# Patient Record
Sex: MALE | Race: BLACK OR AFRICAN AMERICAN | Employment: OTHER | URBAN - METROPOLITAN AREA
[De-identification: names, ages, dates, MRNs, and addresses within clinical notes are randomized per-mention and may not be internally consistent; named-entity substitution may affect disease eponyms.]

---

## 2017-10-09 ENCOUNTER — HOME HEALTH ADMISSION (OUTPATIENT)
Dept: HOME HEALTH SERVICES | Facility: HOME HEALTH | Age: 82
End: 2017-10-09
Payer: MEDICARE

## 2017-10-12 ENCOUNTER — HOSPITAL ENCOUNTER (OUTPATIENT)
Dept: LAB | Age: 82
Discharge: HOME | End: 2017-10-12
Attending: FAMILY MEDICINE
Payer: MEDICARE

## 2017-10-12 ENCOUNTER — HOME CARE VISIT (OUTPATIENT)
Dept: SCHEDULING | Facility: HOME HEALTH | Age: 82
End: 2017-10-12
Payer: MEDICARE

## 2017-10-12 LAB
EST. AVERAGE GLUCOSE BLD GHB EST-MCNC: 134 MG/DL
HBA1C MFR BLD: 6.3 % (ref 4.8–6)

## 2017-10-12 PROCEDURE — 3331090002 HH PPS REVENUE DEBIT

## 2017-10-12 PROCEDURE — 3331090001 HH PPS REVENUE CREDIT

## 2017-10-12 PROCEDURE — 83036 HEMOGLOBIN GLYCOSYLATED A1C: CPT | Performed by: FAMILY MEDICINE

## 2017-10-12 PROCEDURE — G0299 HHS/HOSPICE OF RN EA 15 MIN: HCPCS

## 2017-10-12 PROCEDURE — 36415 COLL VENOUS BLD VENIPUNCTURE: CPT | Performed by: FAMILY MEDICINE

## 2017-10-12 PROCEDURE — 400013 HH SOC

## 2017-10-13 PROCEDURE — 3331090001 HH PPS REVENUE CREDIT

## 2017-10-13 PROCEDURE — 3331090002 HH PPS REVENUE DEBIT

## 2017-10-14 ENCOUNTER — HOME CARE VISIT (OUTPATIENT)
Dept: HOME HEALTH SERVICES | Facility: HOME HEALTH | Age: 82
End: 2017-10-14
Payer: MEDICARE

## 2017-10-14 PROCEDURE — 3331090002 HH PPS REVENUE DEBIT

## 2017-10-14 PROCEDURE — 3331090001 HH PPS REVENUE CREDIT

## 2017-10-15 VITALS
SYSTOLIC BLOOD PRESSURE: 110 MMHG | TEMPERATURE: 98.4 F | OXYGEN SATURATION: 97 % | HEART RATE: 83 BPM | DIASTOLIC BLOOD PRESSURE: 74 MMHG | RESPIRATION RATE: 16 BRPM

## 2017-10-15 PROCEDURE — 3331090002 HH PPS REVENUE DEBIT

## 2017-10-15 PROCEDURE — 3331090001 HH PPS REVENUE CREDIT

## 2017-10-16 PROCEDURE — 3331090001 HH PPS REVENUE CREDIT

## 2017-10-16 PROCEDURE — 3331090002 HH PPS REVENUE DEBIT

## 2017-10-17 ENCOUNTER — HOME CARE VISIT (OUTPATIENT)
Dept: SCHEDULING | Facility: HOME HEALTH | Age: 82
End: 2017-10-17
Payer: MEDICARE

## 2017-10-17 ENCOUNTER — HOSPITAL ENCOUNTER (EMERGENCY)
Age: 82
Discharge: HOME | End: 2017-10-17
Attending: EMERGENCY MEDICINE
Payer: MEDICARE

## 2017-10-17 ENCOUNTER — HOME CARE VISIT (OUTPATIENT)
Dept: HOME HEALTH SERVICES | Facility: HOME HEALTH | Age: 82
End: 2017-10-17
Payer: MEDICARE

## 2017-10-17 VITALS
HEART RATE: 89 BPM | SYSTOLIC BLOOD PRESSURE: 130 MMHG | DIASTOLIC BLOOD PRESSURE: 80 MMHG | OXYGEN SATURATION: 96 % | RESPIRATION RATE: 16 BRPM | TEMPERATURE: 98.5 F

## 2017-10-17 VITALS
SYSTOLIC BLOOD PRESSURE: 137 MMHG | BODY MASS INDEX: 27.7 KG/M2 | WEIGHT: 187 LBS | TEMPERATURE: 97.5 F | HEIGHT: 69 IN | DIASTOLIC BLOOD PRESSURE: 77 MMHG | HEART RATE: 82 BPM | RESPIRATION RATE: 18 BRPM

## 2017-10-17 DIAGNOSIS — R53.82 CHRONIC FATIGUE: ICD-10-CM

## 2017-10-17 DIAGNOSIS — R53.1 WEAKNESS: Primary | ICD-10-CM

## 2017-10-17 LAB
ALBUMIN SERPL-MCNC: 3.2 G/DL (ref 3.2–4.6)
ALBUMIN/GLOB SERPL: 0.7 {RATIO} (ref 1.2–3.5)
ALP SERPL-CCNC: 76 U/L (ref 50–136)
ALT SERPL-CCNC: 16 U/L (ref 12–65)
ANION GAP SERPL CALC-SCNC: 6 MMOL/L (ref 7–16)
AST SERPL-CCNC: 25 U/L (ref 15–37)
ATRIAL RATE: 105 BPM
BASOPHILS # BLD: 0 K/UL (ref 0–0.2)
BASOPHILS NFR BLD: 0 % (ref 0–2)
BILIRUB SERPL-MCNC: 0.4 MG/DL (ref 0.2–1.1)
BUN SERPL-MCNC: 16 MG/DL (ref 8–23)
CALCIUM SERPL-MCNC: 9.1 MG/DL (ref 8.3–10.4)
CALCULATED R AXIS, ECG10: 78 DEGREES
CALCULATED T AXIS, ECG11: -16 DEGREES
CHLORIDE SERPL-SCNC: 108 MMOL/L (ref 98–107)
CO2 SERPL-SCNC: 26 MMOL/L (ref 21–32)
CREAT SERPL-MCNC: 1.08 MG/DL (ref 0.8–1.5)
DIAGNOSIS, 93000: NORMAL
DIFFERENTIAL METHOD BLD: ABNORMAL
EOSINOPHIL # BLD: 0.1 K/UL (ref 0–0.8)
EOSINOPHIL NFR BLD: 1 % (ref 0.5–7.8)
ERYTHROCYTE [DISTWIDTH] IN BLOOD BY AUTOMATED COUNT: 14.4 % (ref 11.9–14.6)
GLOBULIN SER CALC-MCNC: 4.9 G/DL (ref 2.3–3.5)
GLUCOSE SERPL-MCNC: 99 MG/DL (ref 65–100)
HCT VFR BLD AUTO: 32.2 % (ref 41.1–50.3)
HGB BLD-MCNC: 11.3 G/DL (ref 13.6–17.2)
IMM GRANULOCYTES # BLD: 0 K/UL (ref 0–0.5)
IMM GRANULOCYTES NFR BLD: 0.3 % (ref 0–5)
LYMPHOCYTES # BLD: 1.8 K/UL (ref 0.5–4.6)
LYMPHOCYTES NFR BLD: 27 % (ref 13–44)
MCH RBC QN AUTO: 32.2 PG (ref 26.1–32.9)
MCHC RBC AUTO-ENTMCNC: 35.1 G/DL (ref 31.4–35)
MCV RBC AUTO: 91.7 FL (ref 79.6–97.8)
MONOCYTES # BLD: 0.7 K/UL (ref 0.1–1.3)
MONOCYTES NFR BLD: 11 % (ref 4–12)
NEUTS SEG # BLD: 4 K/UL (ref 1.7–8.2)
NEUTS SEG NFR BLD: 61 % (ref 43–78)
PLATELET # BLD AUTO: 191 K/UL (ref 150–450)
PMV BLD AUTO: 11.6 FL (ref 10.8–14.1)
POTASSIUM SERPL-SCNC: 4 MMOL/L (ref 3.5–5.1)
PROT SERPL-MCNC: 8.1 G/DL (ref 6.3–8.2)
Q-T INTERVAL, ECG07: 360 MS
QRS DURATION, ECG06: 100 MS
QTC CALCULATION (BEZET), ECG08: 415 MS
RBC # BLD AUTO: 3.51 M/UL (ref 4.23–5.67)
SODIUM SERPL-SCNC: 140 MMOL/L (ref 136–145)
VENTRICULAR RATE, ECG03: 80 BPM
WBC # BLD AUTO: 6.6 K/UL (ref 4.3–11.1)

## 2017-10-17 PROCEDURE — 80053 COMPREHEN METABOLIC PANEL: CPT | Performed by: EMERGENCY MEDICINE

## 2017-10-17 PROCEDURE — 3331090001 HH PPS REVENUE CREDIT

## 2017-10-17 PROCEDURE — 3331090002 HH PPS REVENUE DEBIT

## 2017-10-17 PROCEDURE — 85025 COMPLETE CBC W/AUTO DIFF WBC: CPT | Performed by: EMERGENCY MEDICINE

## 2017-10-17 PROCEDURE — 93005 ELECTROCARDIOGRAM TRACING: CPT | Performed by: EMERGENCY MEDICINE

## 2017-10-17 PROCEDURE — 99284 EMERGENCY DEPT VISIT MOD MDM: CPT | Performed by: EMERGENCY MEDICINE

## 2017-10-17 PROCEDURE — G0299 HHS/HOSPICE OF RN EA 15 MIN: HCPCS

## 2017-10-17 NOTE — ED TRIAGE NOTES
PT arrives via EMS after a fall last night. Pt was discharged was [facility] this AM, family states no d/c paperwork or diagnosis was given. Pt has been weak for the past year and is being seen by home health twice for generalized weakness diagnosis. Pt has no new complaints today, is not in distress, all VSS stable.    VS: 126/85, hr 85

## 2017-10-17 NOTE — DISCHARGE INSTRUCTIONS
Fatigue: Care Instructions  Your Care Instructions  Fatigue is a feeling of tiredness, exhaustion, or lack of energy. You may feel fatigue because of too much or not enough activity. It can also come from stress, lack of sleep, boredom, and poor diet. Many medical problems, such as viral infections, can cause fatigue. Emotional problems, especially depression, are often the cause of fatigue. Fatigue is most often a symptom of another problem. Treatment for fatigue depends on the cause. For example, if you have fatigue because you have a certain health problem, treating this problem also treats your fatigue. If depression or anxiety is the cause, treatment may help. Follow-up care is a key part of your treatment and safety. Be sure to make and go to all appointments, and call your doctor if you are having problems. It's also a good idea to know your test results and keep a list of the medicines you take. How can you care for yourself at home? · Get regular exercise. But don't overdo it. Go back and forth between rest and exercise. · Get plenty of rest.  · Eat a healthy diet. Do not skip meals, especially breakfast.  · Reduce your use of caffeine, tobacco, and alcohol. Caffeine is most often found in coffee, tea, cola drinks, and chocolate. · Limit medicines that can cause fatigue. This includes tranquilizers and cold and allergy medicines. When should you call for help? Watch closely for changes in your health, and be sure to contact your doctor if:  · You have new symptoms such as fever or a rash. · Your fatigue gets worse. · You have been feeling down, depressed, or hopeless. Or you may have lost interest in things that you usually enjoy. · You are not getting better as expected. Where can you learn more? Go to http://katie-natalya.info/. Enter Z530 in the search box to learn more about \"Fatigue: Care Instructions. \"  Current as of: March 20, 2017  Content Version: 11.3  © 2130-8542 [employer], [employer]. Care instructions adapted under license by [employer] (which disclaims liability or warranty for this information). If you have questions about a medical condition or this instruction, always ask your healthcare professional. [address] any warranty or liability for your use of this information. Weakness: Care Instructions  Your Care Instructions  Weakness is a lack of physical or muscle strength. You may feel that you need to make extra effort to move your arms, legs, or other muscles. Generalized weakness means that you feel weak in most areas of your body. Another type of weakness may affect just one muscle or group of muscles. You may feel weak and tired after you have done too much activity, such as taking an extra-long hike. This is not a serious problem. It often goes away on its own. Feeling weak can also be caused by medical conditions like thyroid problems, depression, or a virus. Sometimes the cause can be serious. Your doctor may want to do more tests to try to find the cause of the weakness. The doctor has checked you carefully, but problems can develop later. If you notice any problems or new symptoms, get medical treatment right away. Follow-up care is a key part of your treatment and safety. Be sure to make and go to all appointments, and call your doctor if you are having problems. It's also a good idea to know your test results and keep a list of the medicines you take. How can you care for yourself at home? · Rest when you feel tired. · Be safe with medicines. If your doctor prescribed medicine, take it exactly as prescribed. Call your doctor if you think you are having a problem with your medicine. You will get more details on the specific medicines your doctor prescribes. · Do not skip meals. Eating a balanced diet may increase your energy level.   · Get some physical activity every day, but do not get too tired.  When should you call for help? Call your doctor now or seek immediate medical care if:  · You have new or worse weakness. · You are dizzy or lightheaded, or you feel like you may faint. Watch closely for changes in your health, and be sure to contact your doctor if:  · You do not get better as expected. Where can you learn more? Go to http://katie-natalya.info/. Enter 574 [id number] 3591 in the search box to learn more about \"Weakness: Care Instructions. \"  Current as of: March 20, 2017  Content Version: 11.3  © 3932-3228 [facility]. Care instructions adapted under license by [employer] (which disclaims liability or warranty for this information). If you have questions about a medical condition or this instruction, always ask your healthcare professional. [address] any warranty or liability for your use of this information.

## 2017-10-17 NOTE — PROGRESS NOTES
Patient current with [facility] for Nursing only. Per MD , will have [facility] evaluate for PT / OT.

## 2017-10-17 NOTE — ED PROVIDER NOTES
HPI Comments: 49-year-old male presents with weakness ×1 year. He was seen at [facility] last night and had normal labs, head CT and chest x-ray. His wife who is bedside states there was not given any discharge paperwork or no diagnosis. He has no significant complaints such as chest pain, shortness of breath, vomiting, diarrhea or leg swelling. He states that his primary doctor \"can't figure it out\", in regards to patient's weakness. No significant headaches. Moving all extremities good strength. History of recurrent falls but no recent falls. Patient is a 80 y.o. male presenting with other event. The history is provided by the patient. No  was used. Other   Pertinent negatives include no abdominal pain, no headaches and no shortness of breath. Past Medical History:   Diagnosis Date    Anxiety 12/8/2015    BPH (benign prostatic hypertrophy) 1/30/2011    CKD (chronic kidney disease) stage 3, GFR 30-59 ml/min 1/30/2011    Diabetes mellitus type 2, controlled ([address] 12/8/2015    HTN (hypertension) 10/15/2009    Hyperlipemia, mixed 12/8/2015    Pacemaker ([employer] PM placed 1/31/2011) 2/1/2011    SSS (sick sinus syndrome) ([address]        Past Surgical History:   Procedure Laterality Date    HX APPENDECTOMY      HX OTHER SURGICAL  2008    transrectal resection of carcinoid tumor    HX PACEMAKER      Pacemaker 2/2011    HX TURP  2011         History reviewed. No pertinent family history. Social History     Social History    Marital status:      Spouse name: N/A    Number of children: N/A    Years of education: N/A     Occupational History    Not on file. Social History Main Topics    Smoking status: Never Smoker    Smokeless tobacco: Never Used    Alcohol use No    Drug use: No    Sexual activity: No     Other Topics Concern    Not on file     Social History Narrative         ALLERGIES: Doxazosin    Review of Systems   Constitutional: Positive for fatigue. Negative for fever. Respiratory: Negative for cough and shortness of breath. Gastrointestinal: Negative for abdominal pain, nausea and vomiting. Genitourinary: Negative for dysuria. Skin: Negative for rash. Neurological: Negative for weakness and headaches. Vitals:    10/17/17 1312   BP: 137/77   Pulse: 82   Resp: 18   Temp: 97.5 °F (36.4 °C)   Weight: 84.8 kg (187 lb)   Height: 5' 9\" (1.753 m)            Physical Exam   Constitutional: He is oriented to person, place, and time. He appears well-developed and well-nourished. No distress. HENT:   Head: Normocephalic and atraumatic. Eyes: Conjunctivae and EOM are normal. Pupils are equal, round, and reactive to light. Neck: Normal range of motion. Neck supple. Cardiovascular: Normal rate, regular rhythm and normal heart sounds. Pulmonary/Chest: Effort normal and breath sounds normal. No respiratory distress. He has no wheezes. He has no rales. Abdominal: Soft. He exhibits no distension. There is no tenderness. There is no rebound. Musculoskeletal: Normal range of motion. He exhibits no edema or tenderness. Neurological: He is alert and oriented to person, place, and time. No cranial nerve deficit. Patient has a symmetric smile. Good strength in bilateral upper and lower extremities. Skin: Skin is warm and dry. No rash noted. He is not diaphoretic. Psychiatric: He has a normal mood and affect. His behavior is normal.   Vitals reviewed. MDM  Number of Diagnoses or Management Options  Chronic fatigue: new and does not require workup  Weakness: new and does not require workup  Diagnosis management comments: Patient has had long-standing weakness for about one year. He was evaluated at [facility] last night  And ound to have no significant findings or admission criteria. I do believe the patient will benefit from therapy/occupational therapy and have had the  evaluate him for this.   Patient feels comfortable with discharge to home. Return precautions discussed. I discussed the results of all labs, procedures, radiographs, and treatments with the patient and available family. Treatment plan is agreed upon and the patient is ready for discharge. Questions about treatment in the ED and differential diagnosis of presenting condition were answered. Patient was given verbal discharge instructions including, but not limited to, importance of returning to the emergency department for any concern of worsening or continued symptoms. Instructions were given to follow up with a primary care provider or specialist within 1-2 days. Adverse effects of medications, if prescribed, were discussed and patient was advised to refrain from significant physical activity until followed up by primary care physician and to not drive or operate heavy machinery after taking any sedating substances.           Amount and/or Complexity of Data Reviewed  Clinical lab tests: ordered and reviewed (Results for orders placed or performed during the hospital encounter of 10/17/17  -CBC WITH AUTOMATED DIFF       Result                                            Value                         Ref Range                       WBC                                               6.6                           4.3 - 11.1 K/uL                 RBC                                               3.51 (L)                      4.23 - 5.67 M/uL                HGB                                               11.3 (L)                      13.6 - 17.2 g/dL                HCT                                               32.2 (L)                      41.1 - 50.3 %                   MCV                                               91.7                          79.6 - 97.8 FL                  MCH                                               32.2                          26.1 - 32.9 PG                  MCHC                                              35.1 (H) 31.4 - 35.0 g/dL                RDW                                               14.4                          11.9 - 14.6 %                   PLATELET                                          191                           150 - 450 K/uL                  MPV                                               11.6                          10.8 - 14.1 FL                  DF                                                AUTOMATED                                                     NEUTROPHILS                                       61                            43 - 78 %                       LYMPHOCYTES                                       27                            13 - 44 %                       MONOCYTES                                         11                            4.0 - 12.0 %                    EOSINOPHILS                                       1                             0.5 - 7.8 %                     BASOPHILS                                         0                             0.0 - 2.0 %                     IMMATURE GRANULOCYTES                             0.3                           0.0 - 5.0 %                     ABS. NEUTROPHILS                                  4.0                           1.7 - 8.2 K/UL                  ABS. LYMPHOCYTES                                  1.8                           0.5 - 4.6 K/UL                  ABS. MONOCYTES                                    0.7                           0.1 - 1.3 K/UL                  ABS. EOSINOPHILS                                  0.1                           0.0 - 0.8 K/UL                  ABS. BASOPHILS                                    0.0                           0.0 - 0.2 K/UL                  ABS. IMM.  GRANS.                                  0.0                           0.0 - 0.5 K/UL             -METABOLIC PANEL, COMPREHENSIVE       Result                                            Value                         Ref Range                       Sodium                                            140                           136 - 145 mmol/L                Potassium                                         4.0                           3.5 - 5.1 mmol/L                Chloride                                          108 (H)                       98 - 107 mmol/L                 CO2                                               26                            21 - 32 mmol/L                  Anion gap                                         6 (L)                         7 - 16 mmol/L                   Glucose                                           99                            65 - 100 mg/dL                  BUN                                               16                            8 - 23 MG/DL                    Creatinine                                        1.08                          0.8 - 1.5 MG/DL                 GFR est AA                                        >60                           >60 ml/min/1.73m2               GFR est non-AA                                    >60                           >60 ml/min/1.73m2               Calcium                                           9.1                           8.3 - 10.4 MG/DL                Bilirubin, total                                  0.4                           0.2 - 1.1 MG/DL                 ALT (SGPT)                                        16                            12 - 65 U/L                     AST (SGOT)                                        25                            15 - 37 U/L                     Alk. phosphatase                                  76                            50 - 136 U/L                    Protein, total                                    8.1                           6.3 - 8.2 g/dL                  Albumin                                           3.2                           3.2 - 4.6 g/dL                  Globulin 4.9 (H)                       2.3 - 3.5 g/dL                  A-G Ratio                                         0.7 (L)                       1.2 - 3.5                  -EKG, 12 LEAD, INITIAL       Result                                            Value                         Ref Range                       Ventricular Rate                                  80                            BPM                             Atrial Rate                                       105                           BPM                             QRS Duration                                      100                           ms                              Q-T Interval                                      360                           ms                              QTC Calculation (Bezet)                           415                           ms                              Calculated R Axis                                 78                            degrees                         Calculated T Axis                                 -16                           degrees                         Diagnosis                                                                                                   !! AGE AND GENDER SPECIFIC ECG ANALYSIS !!   NSR long first degree AV block    Low voltage QRS   Cannot rule out Anterior infarct (cited on or before 14-JUL-2016)   Abnormal ECG   When compared with ECG of 19-NOV-2016 15:09,   Previous ECG has undetermined rhythm, needs review   Questionable change in QRS axis   T wave inversion now evident in Inferior leads   Confirmed by NIC CANO (), Ricarda Quezada ([id number]) on 10/17/2017 2:29:19 PM     )  Review and summarize past medical records: yes  Independent visualization of images, tracings, or specimens: yes    Risk of Complications, Morbidity, and/or Mortality  Presenting problems: moderate  Diagnostic procedures: moderate  Management options: moderate    Patient Progress  Patient progress: stable    ED Course       Procedures

## 2017-10-17 NOTE — ED NOTES
I have reviewed discharge instructions with the patient and family. The patient and family verbalized understanding. Patient left ED via Discharge Method: wheelchair to Home with son and wife). Opportunity for questions and clarification provided. Patient given 0 scripts. Family aware the home health nursing,OT, and PT are to contact them about starting routine visits.

## 2017-10-17 NOTE — ED NOTES
Pt is orientated to person and place, but not year.  Pt also states he weights 275, which is not accurate

## 2017-10-18 ENCOUNTER — HOME CARE VISIT (OUTPATIENT)
Dept: SCHEDULING | Facility: HOME HEALTH | Age: 82
End: 2017-10-18
Payer: MEDICARE

## 2017-10-18 ENCOUNTER — PATIENT OUTREACH (OUTPATIENT)
Dept: CASE MANAGEMENT | Age: 82
End: 2017-10-18

## 2017-10-18 VITALS
DIASTOLIC BLOOD PRESSURE: 62 MMHG | RESPIRATION RATE: 16 BRPM | OXYGEN SATURATION: 95 % | SYSTOLIC BLOOD PRESSURE: 118 MMHG | HEART RATE: 80 BPM | TEMPERATURE: 98.5 F

## 2017-10-18 PROCEDURE — 3331090001 HH PPS REVENUE CREDIT

## 2017-10-18 PROCEDURE — G0299 HHS/HOSPICE OF RN EA 15 MIN: HCPCS

## 2017-10-18 PROCEDURE — 3331090002 HH PPS REVENUE DEBIT

## 2017-10-18 NOTE — PROGRESS NOTES
This note will not be viewable in [address] Transition of Care Discharge Follow-up Questionnaire   Date/Time of Call:   October 18, 2017 3:15PM   What was the patient hospitalized for? Patient seen in ED on 10/17/2017 for   Weakness    Chronic fatigue                   Does the patient understand his/her diagnosis and/or treatment and what happened during the hospitalization? Patient states understanding of diagnosis and treatment during hospitalization. Did the patient receive discharge instructions? Yes     Review any discharge instructions (see notes in [facility]). Ask patient if they understand these. Do they have any questions? Patient states understanding of discharge instructions, patient states no questions. Were home services ordered (nursing, PT, OT, ST, etc.)? Patient is currently receiving home health services. If so, has the first visit occurred? If not, why? (Assist with coordination of services if necessary.) Yes         Was any DME ordered? No durable medical equipment ordered. If so, has it been received? If not, why?  (Assist with coordination of arranging DME orders if necessary. ) NA         Complete a review of all medications (new, continued and discontinued meds per the D/C instructions and medication tab in [facility]). Care Coordinator reviewed all medications with patient per [facility], no new medication/s prescribed. Were all new prescriptions filled? If not, why?  (Assist with obtainment of medications if necessary. ) NA         Does the patient understand the purpose and dosing instructions for all medications? (If patient has questions, provide explanation and education.) Patient states understanding of current medications and dosing instructions. Does the patient have any problems in performing ADLs? (If patient is unable to perform ADLs  what is the limiting factor(s)?   Do they have a support system that can assist? If no support system is present, discuss possible assistance that they may be able to obtain.) Patient states he is independent with ADL's and ambulation. Patient states he is doing okay, states he still feels somewhat weak. Patient states spouse is assisting him as needed. Does the patient have all follow-up appointments scheduled? 7 day f/up with PCP?    7-14 day f/up with specialist?    If f/up has not been made  what actions has the care coordinator made to accomplish this? Has transportation been arranged? [facility] follow-up should be within 7 days of discharge; all others should have PCP follow-up within 7 days of discharge; follow-ups with other specialists should be within 7-14 days of discharge.) No, Care Coordinator educated patient on the importance of scheduling follow up with PCP within 7 days. Patient states he will call and schedule follow up with PCP today. Patient declined assistance from Care Coordinator to obtain follow up appointment. Yes        NA           Any other questions or concerns expressed by the patient? No further needs identified, patient instructed to call Care Coordinator if further questions or concerns arise. Schedule next appointment with MINOO Cooney or refer to RN Case Manager/  per the workflow guidelines. When is care coordinators next follow-up call scheduled? If referred for CCM  what RN care manager was the referral assigned?  NA          NA      NA         DAIJA Call Completed By: LINDY Willett ACO  Care Coordinator

## 2017-10-19 ENCOUNTER — APPOINTMENT (OUTPATIENT)
Dept: CT IMAGING | Age: 82
End: 2017-10-19
Attending: EMERGENCY MEDICINE
Payer: MEDICARE

## 2017-10-19 ENCOUNTER — APPOINTMENT (OUTPATIENT)
Dept: GENERAL RADIOLOGY | Age: 82
End: 2017-10-19
Attending: EMERGENCY MEDICINE
Payer: MEDICARE

## 2017-10-19 ENCOUNTER — HOSPITAL ENCOUNTER (EMERGENCY)
Age: 82
Discharge: HOME | End: 2017-10-19
Attending: EMERGENCY MEDICINE
Payer: MEDICARE

## 2017-10-19 VITALS
SYSTOLIC BLOOD PRESSURE: 164 MMHG | BODY MASS INDEX: 29.62 KG/M2 | TEMPERATURE: 98.2 F | HEIGHT: 69 IN | WEIGHT: 200 LBS | DIASTOLIC BLOOD PRESSURE: 88 MMHG | HEART RATE: 82 BPM | RESPIRATION RATE: 18 BRPM | OXYGEN SATURATION: 93 %

## 2017-10-19 DIAGNOSIS — R53.1 WEAKNESS: Primary | ICD-10-CM

## 2017-10-19 LAB
ALBUMIN SERPL-MCNC: 2.9 G/DL (ref 3.2–4.6)
ALBUMIN/GLOB SERPL: 0.6 {RATIO} (ref 1.2–3.5)
ALP SERPL-CCNC: 72 U/L (ref 50–136)
ALT SERPL-CCNC: 13 U/L (ref 12–65)
ANION GAP SERPL CALC-SCNC: 8 MMOL/L (ref 7–16)
AST SERPL-CCNC: 18 U/L (ref 15–37)
ATRIAL RATE: 83 BPM
BILIRUB SERPL-MCNC: 0.4 MG/DL (ref 0.2–1.1)
BUN SERPL-MCNC: 13 MG/DL (ref 8–23)
CALCIUM SERPL-MCNC: 8.2 MG/DL (ref 8.3–10.4)
CALCULATED P AXIS, ECG09: 72 DEGREES
CALCULATED R AXIS, ECG10: 59 DEGREES
CALCULATED T AXIS, ECG11: -45 DEGREES
CHLORIDE SERPL-SCNC: 108 MMOL/L (ref 98–107)
CO2 SERPL-SCNC: 25 MMOL/L (ref 21–32)
CREAT SERPL-MCNC: 1.07 MG/DL (ref 0.8–1.5)
DIAGNOSIS, 93000: NORMAL
GLOBULIN SER CALC-MCNC: 4.7 G/DL (ref 2.3–3.5)
GLUCOSE SERPL-MCNC: 105 MG/DL (ref 65–100)
P-R INTERVAL, ECG05: 360 MS
POTASSIUM SERPL-SCNC: 3.2 MMOL/L (ref 3.5–5.1)
PROT SERPL-MCNC: 7.6 G/DL (ref 6.3–8.2)
Q-T INTERVAL, ECG07: 364 MS
QRS DURATION, ECG06: 104 MS
QTC CALCULATION (BEZET), ECG08: 427 MS
SODIUM SERPL-SCNC: 141 MMOL/L (ref 136–145)
TROPONIN I SERPL-MCNC: <0.02 NG/ML (ref 0.02–0.05)
VENTRICULAR RATE, ECG03: 83 BPM

## 2017-10-19 PROCEDURE — 71010 XR CHEST PORT: CPT

## 2017-10-19 PROCEDURE — 3331090001 HH PPS REVENUE CREDIT

## 2017-10-19 PROCEDURE — 3331090002 HH PPS REVENUE DEBIT

## 2017-10-19 PROCEDURE — 84484 ASSAY OF TROPONIN QUANT: CPT | Performed by: EMERGENCY MEDICINE

## 2017-10-19 PROCEDURE — 85025 COMPLETE CBC W/AUTO DIFF WBC: CPT | Performed by: EMERGENCY MEDICINE

## 2017-10-19 PROCEDURE — 93005 ELECTROCARDIOGRAM TRACING: CPT | Performed by: EMERGENCY MEDICINE

## 2017-10-19 PROCEDURE — 70450 CT HEAD/BRAIN W/O DYE: CPT

## 2017-10-19 PROCEDURE — 74011250637 HC RX REV CODE- 250/637: Performed by: EMERGENCY MEDICINE

## 2017-10-19 PROCEDURE — 99285 EMERGENCY DEPT VISIT HI MDM: CPT | Performed by: EMERGENCY MEDICINE

## 2017-10-19 PROCEDURE — 80053 COMPREHEN METABOLIC PANEL: CPT | Performed by: EMERGENCY MEDICINE

## 2017-10-19 PROCEDURE — 81003 URINALYSIS AUTO W/O SCOPE: CPT | Performed by: EMERGENCY MEDICINE

## 2017-10-19 RX ORDER — POTASSIUM CHLORIDE 20 MEQ/1
40 TABLET, EXTENDED RELEASE ORAL
Status: COMPLETED | OUTPATIENT
Start: 2017-10-19 | End: 2017-10-19

## 2017-10-19 RX ADMIN — POTASSIUM CHLORIDE 40 MEQ: 20 TABLET, EXTENDED RELEASE ORAL at 15:19

## 2017-10-19 NOTE — PROGRESS NOTES
Spoke with patient's wife who states that they do have home health with nursing. SW asked to add a  with home health but she declined. He was also recently referred with PT and OT with his home health but it has not started as of yet per his wife. She states that she may look into hiring a caretaker but not sure if they can afford. She has two sons but one also has health issues and one lives out of town.

## 2017-10-19 NOTE — ED PROVIDER NOTES
HPI Comments: Patient is an 70-year-old malewith a history of hypertension, diabetes, and pacemaker placement for sick sinus syndrome. He arrives in the emergency department via EMS from home complaining of generalized weakness and fatigue. It appears that this is a chronic complaint for months to possibly a year. He has had several evaluations at the [facility] emergency Department. He and family were here in our ER 2 days ago for the same complaints with a negative workup. He denies any chest pain or dyspnea. He denies any syncope. He states he is just too weak to get up and get around the house. Family states they are having a hard time caring for him. EMS reported that he did have some brief arrhythmia    Patient is a 80 y.o. male presenting with lethargy. The history is provided by the patient and the spouse. Lethargy   This is a chronic problem. The current episode started more than 1 week ago. The problem occurs constantly. The problem has been gradually worsening. Pertinent negatives include no chest pain, no abdominal pain, no headaches and no shortness of breath. Nothing relieves the symptoms. Past Medical History:   Diagnosis Date    Anxiety 12/8/2015    BPH (benign prostatic hypertrophy) 1/30/2011    CKD (chronic kidney disease) stage 3, GFR 30-59 ml/min 1/30/2011    Diabetes mellitus type 2, controlled ([address] 12/8/2015    HTN (hypertension) 10/15/2009    Hyperlipemia, mixed 12/8/2015    Pacemaker ([employer] [facility] placed 1/31/2011) 2/1/2011    SSS (sick sinus syndrome) ([address]        Past Surgical History:   Procedure Laterality Date    HX APPENDECTOMY      HX OTHER SURGICAL  2008    transrectal resection of carcinoid tumor    HX PACEMAKER      Pacemaker 2/2011    HX TURP  2011         History reviewed. No pertinent family history.     Social History     Social History    Marital status:      Spouse name: N/A    Number of children: N/A    Years of education: N/A Occupational History    Not on file. Social History Main Topics    Smoking status: Never Smoker    Smokeless tobacco: Never Used    Alcohol use No    Drug use: No    Sexual activity: No     Other Topics Concern    Not on file     Social History Narrative         ALLERGIES: Doxazosin    Review of Systems   Constitutional: Positive for fatigue. Negative for chills and fever. HENT: Negative. Eyes: Negative. Respiratory: Negative for shortness of breath. Cardiovascular: Negative for chest pain. Gastrointestinal: Negative for abdominal pain. Endocrine: Negative. Genitourinary: Negative. Musculoskeletal: Negative. Skin: Negative. Neurological: Positive for weakness. Negative for seizures, syncope, numbness and headaches. Vitals:    10/19/17 1409 10/19/17 1429 10/19/17 1449 10/19/17 1509   BP: 126/76 125/75 129/77 133/78   Pulse: 81 81 82 77   Resp:       Temp:       SpO2: 98% 99% 96% 91%   Weight:       Height:                Physical Exam   Constitutional: He is oriented to person, place, and time. He appears well-developed and well-nourished. HENT:   Head: Normocephalic and atraumatic. Eyes: EOM are normal. Pupils are equal, round, and reactive to light. Cardiovascular: Normal rate and regular rhythm. Pulmonary/Chest: Effort normal and breath sounds normal. No respiratory distress. Pacemaker on the left upper chest   Abdominal: Soft. There is no tenderness. Neurological: He is alert and oriented to person, place, and time. No cranial nerve deficit or sensory deficit. Diffuse generalized weakness without focal deficits   Skin: Skin is warm and dry. No rash noted. Nursing note and vitals reviewed.        MDM  Number of Diagnoses or Management Options  Diagnosis management comments: Differential diagnosis includes weakness, fatigue, anemia, infection, stroke, dehydration, electrolyte imbalance    Prior records were reviewed and he has had 2 recent visits to the [facility] emergency Department with negative workups. He was last here in our ER 2 days ago for the same thing. EKG now shows a normal sinus rhythm with a rate of 83 and no acute ischemic changes. Labs are unremarkable. I have ordered a repeat CAT scan of the head and social work will also look into the case and speak with the family. Amount and/or Complexity of Data Reviewed  Clinical lab tests: ordered and reviewed  Tests in the radiology section of CPT®: ordered and reviewed  Review and summarize past medical records: yes  Independent visualization of images, tracings, or specimens: yes    Risk of Complications, Morbidity, and/or Mortality  Presenting problems: moderate  Diagnostic procedures: moderate  Management options: moderate    Patient Progress  Patient progress: stable    ED Course   4:01 PM  vital signs are normal and labs are unremarkable. We also interrogated his pacemaker in the [facility] representative called and reported the patient has had no arrhythmias. He is in a normal sinus rhythm now he does intermittently have ventricular pacing and that is possibly what the paramedics noted while en route in the ambulance. CT head is negative for any acute process. 5:19 PM  Urinalysis is clean, labs are unremarkable. Social work also met with the patient and will arrange for some additional resources. At the current time there is no reason for 4 medical admission tonight. Voice dictation software was used during the making of this note. This software is not perfect and grammatical and other typographical errors may be present. This note has been proofread, but may still contain errors.   Dev Ruth MD; 10/19/2017 @5:19 PM   ===================================================================        Procedures

## 2017-10-19 NOTE — ED TRIAGE NOTES
Pt arrives via [facility] complaining of weakness and fatigue. Pt had a 6 beat run of vtach, has also gone into afib rvr and other rythms according to EMS. Pt was in the low 80s o2 when ems arrived. Complaining of back pain as well. Pt alert and oriented. Has a pace maker and spinal cord stimulator.

## 2017-10-19 NOTE — DISCHARGE INSTRUCTIONS
Weakness: Care Instructions  Your Care Instructions  Weakness is a lack of physical or muscle strength. You may feel that you need to make extra effort to move your arms, legs, or other muscles. Generalized weakness means that you feel weak in most areas of your body. Another type of weakness may affect just one muscle or group of muscles. You may feel weak and tired after you have done too much activity, such as taking an extra-long hike. This is not a serious problem. It often goes away on its own. Feeling weak can also be caused by medical conditions like thyroid problems, depression, or a virus. Sometimes the cause can be serious. Your doctor may want to do more tests to try to find the cause of the weakness. The doctor has checked you carefully, but problems can develop later. If you notice any problems or new symptoms, get medical treatment right away. Follow-up care is a key part of your treatment and safety. Be sure to make and go to all appointments, and call your doctor if you are having problems. It's also a good idea to know your test results and keep a list of the medicines you take. How can you care for yourself at home? · Rest when you feel tired. · Be safe with medicines. If your doctor prescribed medicine, take it exactly as prescribed. Call your doctor if you think you are having a problem with your medicine. You will get more details on the specific medicines your doctor prescribes. · Do not skip meals. Eating a balanced diet may increase your energy level. · Get some physical activity every day, but do not get too tired. When should you call for help? Call your doctor now or seek immediate medical care if:  · You have new or worse weakness. · You are dizzy or lightheaded, or you feel like you may faint. Watch closely for changes in your health, and be sure to contact your doctor if:  · You do not get better as expected. Where can you learn more?   Go to http://lola.info/. Enter [id number] in the search box to learn more about \"Weakness: Care Instructions. \"  Current as of: March 20, 2017  Content Version: 11.3  © 3051-8159 [employer]. Care instructions adapted under license by [employer] (which disclaims liability or warranty for this information). If you have questions about a medical condition or this instruction, always ask your healthcare professional. [address] any warranty or liability for your use of this information.

## 2017-10-19 NOTE — ED NOTES
I have reviewed discharge instructions with the spouse. The spouse verbalized understanding. Patient left ED via Discharge Method: stretcher to Home with family via [facility] transportation. Patient waiting for transport home at this time. Opportunity for questions and clarification provided. Patient given 0 scripts.

## 2017-10-20 ENCOUNTER — HOME CARE VISIT (OUTPATIENT)
Dept: SCHEDULING | Facility: HOME HEALTH | Age: 82
End: 2017-10-20
Payer: MEDICARE

## 2017-10-20 ENCOUNTER — PATIENT OUTREACH (OUTPATIENT)
Dept: CASE MANAGEMENT | Age: 82
End: 2017-10-20

## 2017-10-20 VITALS — DIASTOLIC BLOOD PRESSURE: 70 MMHG | SYSTOLIC BLOOD PRESSURE: 115 MMHG | HEART RATE: 80 BPM

## 2017-10-20 PROCEDURE — 3331090001 HH PPS REVENUE CREDIT

## 2017-10-20 PROCEDURE — 3331090002 HH PPS REVENUE DEBIT

## 2017-10-20 PROCEDURE — G0152 HHCP-SERV OF OT,EA 15 MIN: HCPCS

## 2017-10-20 NOTE — Clinical Note
[facility] referral. Patient is receiving home health but interested in a home health aide. He is no longer able to walk with or without assistance. Spouse states he is very weak. Family members have assisted him to the toilet today but will not always be around. TY!

## 2017-10-20 NOTE — PROGRESS NOTES
Initial outreach attempt to patient was unsuccessful. Second outreach attempt will be made within 24 hours. This note will not be viewable in [address]

## 2017-10-21 PROCEDURE — 3331090002 HH PPS REVENUE DEBIT

## 2017-10-21 PROCEDURE — 3331090001 HH PPS REVENUE CREDIT

## 2017-10-21 NOTE — PROGRESS NOTES
Transition of Care Discharge Follow-up Questionnaire   Date/Time of Call:   10/20/17 7:15p   What was the patient hospitalized for? Weakness   Does the patient understand his/her diagnosis and/or treatment and what happened during the hospitalization? Patients spouse understands the diagnosis and treatments that occurred. Did the patient receive discharge instructions? Yes   Review any discharge instructions (see notes in [facility]). Ask patient if they understand these. Do they have any questions? Patient spouse understands discharge instructions. Were home services ordered (nursing, PT, OT, ST, etc.)? [facility]   If so, has the first visit occurred? If not, why? (Assist with coordination of services if necessary.) Yes   Was any DME ordered? No   If so, has it been received? If not, why?  (Assist with coordination of arranging DME orders if necessary.) n/a   Complete a review of all medications (new, continued and discontinued meds per the D/C instructions and medication tab in [facility]). Patients spouse and care coordinator reviewed current medications. Were all new prescriptions filled? If not, why?  (Assist with obtainment of medications if necessary.) No medication changes   Does the patient understand the purpose and dosing instructions for all medications? (If patient has questions, provide explanation and education.) Patients spouse does not have questions about the dosing instructions for medications. Does the patient have any problems in performing ADLs? (If patient is unable to perform ADLs  what is the limiting factor(s)? Do they have a support system that can assist? If no support system is present, discuss possible assistance that they may be able to obtain.) Spouse verbalizes concerns about patients inability to perform ADLs. Spouse states that she is unable to assist patient with toileting and out of bed.  Spouse states she called nephews to assist patient to toilet and move around the house today. Patient is no longer able to use a walker and cane. Spouse would like to look into having a home health aide to assist with ADLs. Does the patient have all follow-up appointments scheduled? 7 day f/up with PCP?    7-14 day f/up with specialist?    If f/up has not been made  what actions has the care coordinator made to accomplish this? Has transportation been arranged? [facility] follow-up should be within 7 days of discharge; all others should have PCP follow-up within 7 days of discharge; follow-ups with other specialists should be within 7-14 days of discharge.) Patient has the following appt(s):    10/24 PCP  No concerns about transportation were voiced by the patients spouse to care coordinator. Any other questions or concerns expressed by the patient? No other questions or concerns were expressed by the patients spouse. She was thankful for the phone call. Schedule next appointment with MINOO BOLAÑOS Coordinator or refer to RN Case Manager/  per the workflow guidelines. When is care coordinators next follow-up call scheduled? If referred for CCM  what RN care manager was the referral assigned? CC will refer patient to [facility] LAURA Paz    N/A        Ginny Prader, RN [facility] Call Completed By:   Natalie Mccoy LPN  Care Coordinator       This note will not be viewable in [address]

## 2017-10-22 PROCEDURE — 3331090002 HH PPS REVENUE DEBIT

## 2017-10-22 PROCEDURE — 3331090001 HH PPS REVENUE CREDIT

## 2017-10-23 ENCOUNTER — PATIENT OUTREACH (OUTPATIENT)
Dept: CASE MANAGEMENT | Age: 82
End: 2017-10-23

## 2017-10-23 PROCEDURE — 3331090002 HH PPS REVENUE DEBIT

## 2017-10-23 PROCEDURE — 3331090001 HH PPS REVENUE CREDIT

## 2017-10-23 NOTE — PROGRESS NOTES
CM reviewed record. CM spoke with spouse. Patient has not had 3 day stay, and no hospital admission, because he has not meet any criteria to be admitted; therefore no option for SNF rehab. Patient will not be able to do 3 hours of therapy, [facility]. CM asked spouse has she spoke to anyone about hospice; wife reported no. Spouse reported it was okay to send information for hospice to have a conversation about hospice. CM sent referral via e-mail to community nurse liaison for hospice. Patient has not been eating as much and has declined over the past 6 months. CM noted HHA and SW order was placed. Spouse reported the SW is expected to come out tomorrow morning. This note will not be viewable in [address]

## 2017-10-23 NOTE — PROGRESS NOTES
CM received referral today. CM reviewed record and called HH to discuss with RN adding HHA and SW as wife continues to take patient to ED for weakness, this will be a small substitute until financial picture determined and resource connection occurs        This note will not be viewable in [employer].

## 2017-10-24 ENCOUNTER — APPOINTMENT (OUTPATIENT)
Dept: GENERAL RADIOLOGY | Age: 82
End: 2017-10-24
Attending: EMERGENCY MEDICINE
Payer: MEDICARE

## 2017-10-24 ENCOUNTER — HOME CARE VISIT (OUTPATIENT)
Dept: SCHEDULING | Facility: HOME HEALTH | Age: 82
End: 2017-10-24
Payer: MEDICARE

## 2017-10-24 ENCOUNTER — APPOINTMENT (OUTPATIENT)
Dept: CT IMAGING | Age: 82
End: 2017-10-24
Attending: EMERGENCY MEDICINE
Payer: MEDICARE

## 2017-10-24 ENCOUNTER — PATIENT OUTREACH (OUTPATIENT)
Dept: CASE MANAGEMENT | Age: 82
End: 2017-10-24

## 2017-10-24 ENCOUNTER — HOSPITAL ENCOUNTER (EMERGENCY)
Age: 82
Discharge: HOME | End: 2017-10-24
Attending: EMERGENCY MEDICINE
Payer: MEDICARE

## 2017-10-24 VITALS
BODY MASS INDEX: 36.43 KG/M2 | DIASTOLIC BLOOD PRESSURE: 82 MMHG | HEART RATE: 79 BPM | WEIGHT: 246 LBS | SYSTOLIC BLOOD PRESSURE: 137 MMHG | TEMPERATURE: 98.5 F | HEIGHT: 69 IN | RESPIRATION RATE: 16 BRPM | OXYGEN SATURATION: 94 %

## 2017-10-24 VITALS
RESPIRATION RATE: 18 BRPM | TEMPERATURE: 99.7 F | DIASTOLIC BLOOD PRESSURE: 64 MMHG | HEART RATE: 79 BPM | SYSTOLIC BLOOD PRESSURE: 122 MMHG | OXYGEN SATURATION: 97 %

## 2017-10-24 VITALS
DIASTOLIC BLOOD PRESSURE: 64 MMHG | RESPIRATION RATE: 18 BRPM | SYSTOLIC BLOOD PRESSURE: 122 MMHG | OXYGEN SATURATION: 94 % | HEART RATE: 78 BPM | TEMPERATURE: 99.4 F

## 2017-10-24 DIAGNOSIS — S22.000A THORACIC COMPRESSION FRACTURE, CLOSED, INITIAL ENCOUNTER (HCC): ICD-10-CM

## 2017-10-24 DIAGNOSIS — M54.9 CHRONIC MIDLINE BACK PAIN, UNSPECIFIED BACK LOCATION: ICD-10-CM

## 2017-10-24 DIAGNOSIS — R53.1 GENERALIZED WEAKNESS: ICD-10-CM

## 2017-10-24 DIAGNOSIS — G89.29 CHRONIC MIDLINE BACK PAIN, UNSPECIFIED BACK LOCATION: ICD-10-CM

## 2017-10-24 DIAGNOSIS — Z78.9 FAILURE OF OUTPATIENT TREATMENT: Primary | ICD-10-CM

## 2017-10-24 LAB
ANION GAP SERPL CALC-SCNC: 9 MMOL/L (ref 7–16)
BASOPHILS # BLD: 0 K/UL (ref 0–0.2)
BASOPHILS NFR BLD: 0 % (ref 0–2)
BUN SERPL-MCNC: 23 MG/DL (ref 8–23)
CALCIUM SERPL-MCNC: 9.2 MG/DL (ref 8.3–10.4)
CHLORIDE SERPL-SCNC: 101 MMOL/L (ref 98–107)
CO2 SERPL-SCNC: 28 MMOL/L (ref 21–32)
CREAT SERPL-MCNC: 1.4 MG/DL (ref 0.8–1.5)
DIFFERENTIAL METHOD BLD: ABNORMAL
EOSINOPHIL # BLD: 0.2 K/UL (ref 0–0.8)
EOSINOPHIL NFR BLD: 3 % (ref 0.5–7.8)
ERYTHROCYTE [DISTWIDTH] IN BLOOD BY AUTOMATED COUNT: 14 % (ref 11.9–14.6)
GLUCOSE SERPL-MCNC: 84 MG/DL (ref 65–100)
HCT VFR BLD AUTO: 33.5 % (ref 41.1–50.3)
HGB BLD-MCNC: 11.5 G/DL (ref 13.6–17.2)
IMM GRANULOCYTES # BLD: 0 K/UL (ref 0–0.5)
IMM GRANULOCYTES NFR BLD: 0 % (ref 0–5)
LYMPHOCYTES # BLD: 2 K/UL (ref 0.5–4.6)
LYMPHOCYTES NFR BLD: 29 % (ref 13–44)
MCH RBC QN AUTO: 31.9 PG (ref 26.1–32.9)
MCHC RBC AUTO-ENTMCNC: 34.3 G/DL (ref 31.4–35)
MCV RBC AUTO: 93.1 FL (ref 79.6–97.8)
MONOCYTES # BLD: 0.7 K/UL (ref 0.1–1.3)
MONOCYTES NFR BLD: 11 % (ref 4–12)
NEUTS SEG # BLD: 4 K/UL (ref 1.7–8.2)
NEUTS SEG NFR BLD: 57 % (ref 43–78)
PLATELET # BLD AUTO: 198 K/UL (ref 150–450)
PMV BLD AUTO: 11.6 FL (ref 10.8–14.1)
POTASSIUM SERPL-SCNC: 4 MMOL/L (ref 3.5–5.1)
RBC # BLD AUTO: 3.6 M/UL (ref 4.23–5.67)
SODIUM SERPL-SCNC: 138 MMOL/L (ref 136–145)
TROPONIN I BLD-MCNC: 0.01 NG/ML (ref 0.02–0.05)
WBC # BLD AUTO: 6.9 K/UL (ref 4.3–11.1)

## 2017-10-24 PROCEDURE — 71010 XR CHEST PORT: CPT

## 2017-10-24 PROCEDURE — 74177 CT ABD & PELVIS W/CONTRAST: CPT

## 2017-10-24 PROCEDURE — 74011636320 HC RX REV CODE- 636/320: Performed by: EMERGENCY MEDICINE

## 2017-10-24 PROCEDURE — 3331090001 HH PPS REVENUE CREDIT

## 2017-10-24 PROCEDURE — 3331090002 HH PPS REVENUE DEBIT

## 2017-10-24 PROCEDURE — 85025 COMPLETE CBC W/AUTO DIFF WBC: CPT | Performed by: EMERGENCY MEDICINE

## 2017-10-24 PROCEDURE — 96361 HYDRATE IV INFUSION ADD-ON: CPT | Performed by: EMERGENCY MEDICINE

## 2017-10-24 PROCEDURE — 99285 EMERGENCY DEPT VISIT HI MDM: CPT | Performed by: EMERGENCY MEDICINE

## 2017-10-24 PROCEDURE — 74011000258 HC RX REV CODE- 258: Performed by: EMERGENCY MEDICINE

## 2017-10-24 PROCEDURE — 74011250636 HC RX REV CODE- 250/636: Performed by: EMERGENCY MEDICINE

## 2017-10-24 PROCEDURE — 96374 THER/PROPH/DIAG INJ IV PUSH: CPT | Performed by: EMERGENCY MEDICINE

## 2017-10-24 PROCEDURE — 81003 URINALYSIS AUTO W/O SCOPE: CPT | Performed by: EMERGENCY MEDICINE

## 2017-10-24 PROCEDURE — 80048 BASIC METABOLIC PNL TOTAL CA: CPT | Performed by: EMERGENCY MEDICINE

## 2017-10-24 PROCEDURE — 84484 ASSAY OF TROPONIN QUANT: CPT

## 2017-10-24 PROCEDURE — G0158 HHC OT ASSISTANT EA 15: HCPCS

## 2017-10-24 PROCEDURE — G0299 HHS/HOSPICE OF RN EA 15 MIN: HCPCS

## 2017-10-24 RX ORDER — SODIUM CHLORIDE 0.9 % (FLUSH) 0.9 %
10 SYRINGE (ML) INJECTION
Status: COMPLETED | OUTPATIENT
Start: 2017-10-24 | End: 2017-10-24

## 2017-10-24 RX ORDER — METHYLPREDNISOLONE 4 MG/1
TABLET ORAL
Qty: 1 DOSE PACK | Refills: 0 | Status: SHIPPED | OUTPATIENT
Start: 2017-10-24 | End: 2017-12-20

## 2017-10-24 RX ORDER — LORAZEPAM 1 MG/1
1 TABLET ORAL
COMMUNITY
End: 2017-12-20

## 2017-10-24 RX ORDER — MORPHINE SULFATE 15 MG/1
15 TABLET ORAL
Qty: 15 TAB | Refills: 0 | Status: SHIPPED | OUTPATIENT
Start: 2017-10-24 | End: 2017-12-20

## 2017-10-24 RX ORDER — MORPHINE SULFATE 10 MG/ML
5 INJECTION, SOLUTION INTRAMUSCULAR; INTRAVENOUS
Status: COMPLETED | OUTPATIENT
Start: 2017-10-24 | End: 2017-10-24

## 2017-10-24 RX ADMIN — SODIUM CHLORIDE 1000 ML: 900 INJECTION, SOLUTION INTRAVENOUS at 13:20

## 2017-10-24 RX ADMIN — SODIUM CHLORIDE 100 ML: 900 INJECTION, SOLUTION INTRAVENOUS at 14:18

## 2017-10-24 RX ADMIN — Medication 10 ML: at 14:18

## 2017-10-24 RX ADMIN — MORPHINE SULFATE 5 MG: 10 INJECTION INTRAMUSCULAR; INTRAVENOUS; SUBCUTANEOUS at 13:22

## 2017-10-24 RX ADMIN — IOPAMIDOL 100 ML: 755 INJECTION, SOLUTION INTRAVENOUS at 14:18

## 2017-10-24 NOTE — DISCHARGE INSTRUCTIONS
Back Pain: Care Instructions  Your Care Instructions    Back pain has many possible causes. It is often related to problems with muscles and ligaments of the back. It may also be related to problems with the nerves, discs, or bones of the back. Moving, lifting, standing, sitting, or sleeping in an awkward way can strain the back. Sometimes you don't notice the injury until later. Arthritis is another common cause of back pain. Although it may hurt a lot, back pain usually improves on its own within several weeks. Most people recover in 12 weeks or less. Using good home treatment and being careful not to stress your back can help you feel better sooner. Follow-up care is a key part of your treatment and safety. Be sure to make and go to all appointments, and call your doctor if you are having problems. Its also a good idea to know your test results and keep a list of the medicines you take. How can you care for yourself at home? · Sit or lie in positions that are most comfortable and reduce your pain. Try one of these positions when you lie down:  ¨ Lie on your back with your knees bent and supported by large pillows. ¨ Lie on the floor with your legs on the seat of a sofa or chair. Kip Shonna on your side with your knees and hips bent and a pillow between your legs. ¨ Lie on your stomach if it does not make pain worse. · Do not sit up in bed, and avoid soft couches and twisted positions. Bed rest can help relieve pain at first, but it delays healing. Avoid bed rest after the first day of back pain. · Change positions every 30 minutes. If you must sit for long periods of time, take breaks from sitting. Get up and walk around, or lie in a comfortable position. · Try using a heating pad on a low or medium setting for 15 to 20 minutes every 2 or 3 hours. Try a warm shower in place of one session with the heating pad. · You can also try an ice pack for 10 to 15 minutes every 2 to 3 hours.  Put a thin cloth between the ice pack and your skin. · Take pain medicines exactly as directed. ¨ If the doctor gave you a prescription medicine for pain, take it as prescribed. ¨ If you are not taking a prescription pain medicine, ask your doctor if you can take an over-the-counter medicine. · Take short walks several times a day. You can start with 5 to 10 minutes, 3 or 4 times a day, and work up to longer walks. Walk on level surfaces and avoid hills and stairs until your back is better. · Return to work and other activities as soon as you can. Continued rest without activity is usually not good for your back. · To prevent future back pain, do exercises to stretch and strengthen your back and stomach. Learn how to use good posture, safe lifting techniques, and proper body mechanics. When should you call for help? Call your doctor now or seek immediate medical care if:  · You have new or worsening numbness in your legs. · You have new or worsening weakness in your legs. (This could make it hard to stand up.)  · You lose control of your bladder or bowels. Watch closely for changes in your health, and be sure to contact your doctor if:  · Your pain gets worse. · You are not getting better after 2 weeks. Where can you learn more? Go to http://katie-natalya.info/. Enter P404 in the search box to learn more about \"Back Pain: Care Instructions. \"  Current as of: March 21, 2017  Content Version: 11.3  © 7956-1009 [employer]. Care instructions adapted under license by [facility] (which disclaims liability or warranty for this information). If you have questions about a medical condition or this instruction, always ask your healthcare professional. [address] any warranty or liability for your use of this information.

## 2017-10-24 NOTE — PROGRESS NOTES
Dr Perez Toro called back and was updated on patient's situation. States he will see patient in office and attempt another injection as long as patient can get up the steps and on the table in his office. Transferred me to [facility].A. and appointment made for Friday, 10/27 @ 3:45. Records faxed to Dr Perez Toro at his request.   Discussed with Dr Li Chapin and with family. Suggested to family they call ambulance company for transport to ensure patient can get onto table. [address] ambulance contact information provided to family and appointment information written down and given to patient/family as well. Continued hospice discussion from yesterday and patient's wife states she does not think they are interested in hospice but will accept call from hospice liaison in order to be best informed. Discussed hospital bed as family believes patient needs one at this point. Notified I would call and discuss with Dr Jackie Crowell office. Call to Dr Jackie Crowell office and spoke with Dr Leeanne Carrion who states he would be happy to write order and follow hospital bed. Call to Lakeisha Perkins at [facility] who states PT is scheduled to go out to home tomorrow and will facilitate order and delivery of hospital bed. Patient and family informed of the same. Dr Li Chapin aware.

## 2017-10-24 NOTE — Clinical Note
Return review have any other concerning findings or symptoms. Return if your pain is worsened. Please follow-up with Dr. Ceci Hester as scheduled. Complete course of steroid.

## 2017-10-24 NOTE — PROGRESS NOTES
Call to Randine Phoenix RN CM  [facility] to advise patient in ED. Visited with patient, wife Anuj Mariscal ([phone]) and son's girlfriend Ulysses Sanders ([phone]). Patient has two sons, Vick Sheets ([phone]) and Marlo Thompson who lives in [place] but is on his way here today. Per wife, patient is used to caring for his yard and tending to his fish pond. Wife states he has not been able to do yard work in a year but was tending to his fish pond up to 6 weeks ago. States his increased weakness and back pain has gotten him to the point where they cannot get him out of bed. States he was able to walk a month ago. [facility] home health RN started week and patient saw her last Monday, Tuesday and today. [facility] PT was sent in after RN evaluation and they came last Friday and today but patient is too weak and requires 2 people (son Vick Sheets helped PT today). [facility] back stimulator was put in by Dr Veria Mcburney, pain specialist, approximately 2 mths ago and it worked for a while but suddenly stopped about a month or so ago when he sat down to sponge bath his lower body and could not get up. Explained the family that we would have to await test results in ED to determine disposition. Call to Dr Cesilia Chapman office ([phone]) and [facility] who states patient had stimulator placed 6/20/17. [facility] Dr Misty Krueger is with a patient and she will have him call when he is finished.

## 2017-10-24 NOTE — ED TRIAGE NOTES
Pt has chronic back pain stimulator in back. fell a week ago. went downtown ed  approx 1 week ago. Generalized weakness. Pt c/o stimulator not working.

## 2017-10-24 NOTE — ED NOTES
I have reviewed discharge instructions with the patient and spouse. The patient and spouse verbalized understanding. Patient left ED via Discharge Method: stretcher to Home with [facility] ambulance service. Opportunity for questions and clarification provided. Patient given 0 scripts.

## 2017-10-24 NOTE — PROGRESS NOTES
CM has spoken with hospice, Malcolm Leventhal; [place] with , and Ofilia Moritz from ED in regards to patient and plan of care. Hospice is working to reach patient and spouse to have a discussion about hospice care. CM informed that patient will evaluated to determine if he meets inpatient criteria and qualify for 3 night to transfer to SNF, if able to participate in therapy, CM discussed with Ofilia Moritz. This note will not be viewable in [address]

## 2017-10-24 NOTE — ED PROVIDER NOTES
HPI:  80-year-old male brought in by EMS with multiple medical complaints. Has history of chronic renal disease, diabetes, hypertension, hyperlipidemia, pacemaker for sick sinus syndrome is here with complaint of persistent and progressively worsening weakness to the point where he is unable to get up and move around the house. The family is unable to care for himself since his wife is also elderly. They have home health, now however they have difficulty with him due to the fact that he is having difficulty movement. He also has a secondary complaint of back pain. This has been ongoing, progressive and now acutely worsen over the past 4 days. Pain is in the left lower back. Stated severe anytime he tries to move. Unable to move due to pain. He has a neurostimulator that does not appear to be helpful at this time. He also has a third complaint of having constipation that has been ongoing for over the past 5-7 days. No vomiting. No fever. No chest pain or shortness of breath. ROS  Constitutional: No fever, no chills  Skin: no rash  Eye: No vision changes  ENMT: No sore throat, no congestion  Respiratory: No shortness of breath, no cough  Cardiovascular: No chest pain, no palpitations  Gastrointestinal: No vomiting, no nausea, no diarrhea, + constipation, no rectal bleeding, no abdominal pain  : No dysuria, no hematuria  MSK: + back pain, no muscle pain, no joint pain  Neuro: No headache, no change in mental status, no numbness, no tingling, + weakness  Psych: No anxiety, no depression  Endocrine: No hyperglycemia  All other review of systems positive per history of present illness and the above otherwise negative or noncontributory.     Visit Vitals    /78 (BP 1 Location: Right arm)    Pulse 95    Temp 98.5 °F (36.9 °C)    Resp 20    Ht 5' 9\" (1.753 m)    Wt 111.6 kg (246 lb)    SpO2 90%    BMI 36.33 kg/m2     Past Medical History:   Diagnosis Date    Anxiety 12/8/2015    BPH (benign prostatic hypertrophy) 2011    CKD (chronic kidney disease) stage 3, GFR 30-59 ml/min 2011    Diabetes mellitus type 2, controlled ([address] 2015    HTN (hypertension) 10/15/2009    Hyperlipemia, mixed 2015    Pacemaker ([employer] PM placed 2011) 2011    SSS (sick [facility] syndrome) [address] [address]      Past Surgical History:   Procedure Laterality Date    HX APPENDECTOMY      HX ORTHOPAEDIC      HX OTHER SURGICAL      transrectal resection of carcinoid tumor    HX PACEMAKER      Pacemaker 2011    HX TURP      NEUROLOGICAL PROCEDURE UNLISTED       Prior to Admission Medications   Prescriptions Last Dose Informant Patient Reported? Taking? LORazepam (ATIVAN) 1 mg tablet   Yes Yes   Sig: Take 1 mg by mouth every four (4) hours as needed for Anxiety. amLODIPine (NORVASC) 10 mg tablet   No Yes   Sig: Take 1 Tab by mouth daily. Indications: hypertension   aspirin delayed-release 81 mg tablet   Yes Yes   Sig: Take  by mouth daily. benazepril (LOTENSIN) 40 mg tablet   No Yes   Sig: Take 1 Tab by mouth daily. citalopram (CELEXA) 20 mg tablet   No Yes   Si every day for anxiety control   clonazePAM (KLONOPIN) 1 mg tablet   No Yes   Si tid to qid for anxiety   lovastatin (MEVACOR) 40 mg tablet   No Yes   Sig: TAKE ONE TABLET BY MOUTH ONCE DAILY FOR CHOLESTEROL   metFORMIN ER (GLUCOPHAGE XR) 500 mg tablet   No Yes   Si qd  Indications: type 2 diabetes mellitus   trospium (SANCTURA) 20 mg tablet   No Yes   Si bid for bladder      Facility-Administered Medications: None         Adult Exam   General: awake. Alert. Patient moaning and complaining of pain in the back.   Head: normocephalic, atraumatic  ENT: moist mucous membranes  Neck: supple, non-tender; full range of motion  Cardiovascular: regular rate and rhythm, normal peripheral perfusion, no edema  Respiratory: lungs are clear to auscultation; normal respirations; no wheezing, rales or rhonchi  Gastrointestinal: soft, non-tender; no rebound or guarding, no peritoneal signs, no distension  Back: non-tender, full range of motion  Musculoskeletal: he stiff. Has difficulty movement with decreased range of motion. Even with full support by myself and nursing staff we had extreme difficulty sitting him up to examine his back. He screams and moan in pain when we try to sit him up. Pain appeared to be mostly in the left lower back rating down to the left posterior hip. Neurological: alert and oriented, no gross focal deficits; normal speech  Psychiatric: cooperative; appropriate mood and affect    MDM: multiple complaints. Patient has been seen at [facility] emergency department multiple times. Has been seen in our emergency department for back pain, weakness twice in the past 2 weeks. Appear to have progressively worsened symptoms. His back pain is chronic over acutely worsen. We'll obtain labs, urine. We'll obtain CT abdomen and pelvis for assessment. I will lower suspicion for dissection however given his poor overall health, inability to move, severe pain would like to assess for signs of dissection, AAA. He also has constipation. Ct Abd Pelv W Cont    Addendum Date: 10/24/2017    Addendum: This case was discussed with the ordering physician who indicated that the patient has had multiple recent ER visits for exacerbation of chronic lumbar pain. On coronal reformatted images there is a fracture line above the inferior endplate of the F34 vertebra consistent with a relatively acute compression fracture. On sagittal reconstructed images performed on the workstation, there is no retropulsion of fracture fragments into the spinal canal. There is very minimal anterior height loss caused by this compression fracture.       Result Date: 10/24/2017  CT ABDOMEN AND PELVIS WITH CONTRAST 10/24/2017 HISTORY: Generalized abdominal pain and constipation x7 days TECHNIQUE: The patient received oral contrast and 100 mL Isovue-370 nonionic IV contrast. Axial images were obtained through the abdomen and pelvis. Coronal reformatted images were generated. All CT scans at this facility used dose modulation, interactive reconstruction and/or weight based dosing when appropriate to reduce radiation dose to as low as reasonably achievable. COMPARISON: None FINDINGS: Included portions of the lung bases demonstrate a small right pleural effusion with lower lobe atelectasis. A cardiac pacemaker is present along with a thoracic spinal stimulator. ABDOMEN: Numerous bilateral renal cysts are present. These could be further characterized with sonography if indicated. There is no hydronephrosis. The gallbladder is distended without surrounding inflammation. The liver, pancreas, spleen, and adrenal glands are normal in appearance. There is no inflammation in the right lower quadrant. A moderate amount of stool is present in the right hemicolon. Multiple colonic diverticula are present. PELVIS: There is a left inguinal hernia containing fat. There is no free pelvic fluid. A prominent prostate gland indents the base of the bladder. A large amount of fluid is present in the left hemiscrotum. This may be caused by a large hydrocele. IMPRESSION: 1. Small right pleural effusion with lower lobe atelectasis. 2. Multiple bilateral renal cysts. 3. Diverticulosis. 4. Suspected large left-sided hydrocele in the scrotum. Patient has large left sided hydrocele - has had left testicular swelling for many years now. No tenderness to palpation of the left testicle. LT > RT. No penile discharge. Low susp for torsion. CT negative. Spoke with Radiology again. He see spinal stenosis but no significant changes compared to 2013. T12 compression end plate fracture noted that appeared to be new compared to prior CT. No spinal cord compression noted. Spoke with hospitalist. Does not meet admission criteria at this time.    Our Nurse  also assess the patient, spoke with the family members about the patient. Does not meet admission criteria at this time. She also spoke with Dr. Jeanine Hernandez - pain management and obtained a follow up for the patient in the next 2 days for further evaluation of back pain. No hypoxia. UA negative for infection. CT negative for any acute intra-abdominal surgical pathology. Will dc home with medrol dose pack, morphine IR for pain. Strict return precautions given. The patient appeared much more comfortable at this time       Dragon voice recognition software was used to create this note. Although the note has been reviewed and corrected where necessary, additional errors may have been overlooked and remain in the text.

## 2017-10-25 ENCOUNTER — PATIENT OUTREACH (OUTPATIENT)
Dept: CASE MANAGEMENT | Age: 82
End: 2017-10-25

## 2017-10-25 ENCOUNTER — HOME CARE VISIT (OUTPATIENT)
Dept: SCHEDULING | Facility: HOME HEALTH | Age: 82
End: 2017-10-25
Payer: MEDICARE

## 2017-10-25 VITALS
HEART RATE: 72 BPM | DIASTOLIC BLOOD PRESSURE: 70 MMHG | RESPIRATION RATE: 19 BRPM | TEMPERATURE: 96.3 F | SYSTOLIC BLOOD PRESSURE: 110 MMHG

## 2017-10-25 PROCEDURE — G0151 HHCP-SERV OF PT,EA 15 MIN: HCPCS

## 2017-10-25 PROCEDURE — 3331090002 HH PPS REVENUE DEBIT

## 2017-10-25 PROCEDURE — G0155 HHCP-SVS OF CSW,EA 15 MIN: HCPCS

## 2017-10-25 PROCEDURE — 3331090001 HH PPS REVENUE CREDIT

## 2017-10-25 NOTE — PROGRESS NOTES
This note will not be viewable in [address] Patient engaged with Austin Guillen RN case manager. RN aware on ED visit on 10/24/2017 and of recommendation to Hospice. Will close case.

## 2017-10-26 ENCOUNTER — HOME CARE VISIT (OUTPATIENT)
Dept: SCHEDULING | Facility: HOME HEALTH | Age: 82
End: 2017-10-26
Payer: MEDICARE

## 2017-10-26 ENCOUNTER — PATIENT OUTREACH (OUTPATIENT)
Dept: CASE MANAGEMENT | Age: 82
End: 2017-10-26

## 2017-10-26 PROCEDURE — 3331090002 HH PPS REVENUE DEBIT

## 2017-10-26 PROCEDURE — 3331090001 HH PPS REVENUE CREDIT

## 2017-10-26 PROCEDURE — G0158 HHC OT ASSISTANT EA 15: HCPCS

## 2017-10-26 NOTE — PROGRESS NOTES
CM reviewed record. Patient confirmed SW has saw the patient and spouse and did mention Medicaid and [facility]. Spouse is receptive to talking with hospice, CM explained to also discuss palliative care options too, for both patient and spouse, spouse agreed, CM spelled out palliative care. Patient's son was present during the discussion, on the phone as well. CM and spouse discussed keeping the patient moving, drinking fluids, eating fruits and vegetables. CM explained that overuse of the pain management can also slow down bowel activity and to use as ordered, spouse agreed and was able to tell CM what the order was for. Spouse reported she would call for transport once call ended with CM. This note will not be viewable in [address]

## 2017-10-27 ENCOUNTER — HOME CARE VISIT (OUTPATIENT)
Dept: SCHEDULING | Facility: HOME HEALTH | Age: 82
End: 2017-10-27
Payer: MEDICARE

## 2017-10-27 VITALS
SYSTOLIC BLOOD PRESSURE: 132 MMHG | TEMPERATURE: 97.2 F | HEART RATE: 70 BPM | OXYGEN SATURATION: 94 % | RESPIRATION RATE: 17 BRPM | DIASTOLIC BLOOD PRESSURE: 70 MMHG

## 2017-10-27 VITALS
DIASTOLIC BLOOD PRESSURE: 70 MMHG | HEART RATE: 85 BPM | SYSTOLIC BLOOD PRESSURE: 100 MMHG | OXYGEN SATURATION: 91 % | RESPIRATION RATE: 18 BRPM | TEMPERATURE: 98.5 F

## 2017-10-27 PROCEDURE — 3331090001 HH PPS REVENUE CREDIT

## 2017-10-27 PROCEDURE — G0299 HHS/HOSPICE OF RN EA 15 MIN: HCPCS

## 2017-10-27 PROCEDURE — 3331090002 HH PPS REVENUE DEBIT

## 2017-10-28 PROCEDURE — 3331090001 HH PPS REVENUE CREDIT

## 2017-10-28 PROCEDURE — 3331090002 HH PPS REVENUE DEBIT

## 2017-10-29 PROCEDURE — 3331090001 HH PPS REVENUE CREDIT

## 2017-10-29 PROCEDURE — 3331090002 HH PPS REVENUE DEBIT

## 2017-10-30 ENCOUNTER — HOME CARE VISIT (OUTPATIENT)
Dept: HOME HEALTH SERVICES | Facility: HOME HEALTH | Age: 82
End: 2017-10-30
Payer: MEDICARE

## 2017-10-30 ENCOUNTER — HOME CARE VISIT (OUTPATIENT)
Dept: SCHEDULING | Facility: HOME HEALTH | Age: 82
End: 2017-10-30
Payer: MEDICARE

## 2017-10-30 VITALS
SYSTOLIC BLOOD PRESSURE: 129 MMHG | TEMPERATURE: 98.4 F | DIASTOLIC BLOOD PRESSURE: 70 MMHG | RESPIRATION RATE: 18 BRPM | OXYGEN SATURATION: 97 % | HEART RATE: 91 BPM

## 2017-10-30 PROCEDURE — 3331090001 HH PPS REVENUE CREDIT

## 2017-10-30 PROCEDURE — G0158 HHC OT ASSISTANT EA 15: HCPCS

## 2017-10-30 PROCEDURE — 3331090002 HH PPS REVENUE DEBIT

## 2017-10-31 ENCOUNTER — HOME CARE VISIT (OUTPATIENT)
Dept: SCHEDULING | Facility: HOME HEALTH | Age: 82
End: 2017-10-31
Payer: MEDICARE

## 2017-10-31 VITALS — SYSTOLIC BLOOD PRESSURE: 130 MMHG | HEART RATE: 72 BPM | DIASTOLIC BLOOD PRESSURE: 70 MMHG | TEMPERATURE: 96.8 F

## 2017-10-31 PROCEDURE — 3331090002 HH PPS REVENUE DEBIT

## 2017-10-31 PROCEDURE — G0157 HHC PT ASSISTANT EA 15: HCPCS

## 2017-10-31 PROCEDURE — 3331090001 HH PPS REVENUE CREDIT

## 2017-11-01 ENCOUNTER — HOME CARE VISIT (OUTPATIENT)
Dept: SCHEDULING | Facility: HOME HEALTH | Age: 82
End: 2017-11-01

## 2017-11-01 ENCOUNTER — PATIENT OUTREACH (OUTPATIENT)
Dept: CASE MANAGEMENT | Age: 82
End: 2017-11-01

## 2017-11-01 ENCOUNTER — HOME CARE VISIT (OUTPATIENT)
Dept: SCHEDULING | Facility: HOME HEALTH | Age: 82
End: 2017-11-01
Payer: MEDICARE

## 2017-11-01 VITALS
SYSTOLIC BLOOD PRESSURE: 111 MMHG | OXYGEN SATURATION: 95 % | HEART RATE: 75 BPM | DIASTOLIC BLOOD PRESSURE: 71 MMHG | RESPIRATION RATE: 17 BRPM | TEMPERATURE: 98.5 F

## 2017-11-01 VITALS
HEART RATE: 86 BPM | SYSTOLIC BLOOD PRESSURE: 134 MMHG | DIASTOLIC BLOOD PRESSURE: 72 MMHG | RESPIRATION RATE: 16 BRPM | TEMPERATURE: 98.6 F

## 2017-11-01 PROCEDURE — 3331090001 HH PPS REVENUE CREDIT

## 2017-11-01 PROCEDURE — G0158 HHC OT ASSISTANT EA 15: HCPCS

## 2017-11-01 PROCEDURE — 3331090002 HH PPS REVENUE DEBIT

## 2017-11-01 NOTE — PROGRESS NOTES
CM reviewed record. CM made call to  with [facility] to discuss possibility of having patient transitioned to [facility] [place] [facility] behavioral or [facility] for evaluation and med management. Patient visited [facility] ED on 10/31, for low back pain. Patient's family continues to go to ED with no admission, because patient does not meet criteria, therefore no ability to transfer to SNF. JUVENAL and Parveen Chan dicussed having patient evaluated at [facility] at [facility], which they will workup patient to ensure no medical issues are present before admitting to [facility], Parveen Chan agreed that it would be a good plan. CM explained if not the next option would be for patient to [facility] memory program/success on aging, patient has issues with short term memory, coupled with behaviors, possible patient has some type of dementia that has not been diagnosed. CM left vm with Alvarado Sneed, Nurse Manager of the unit for geropysch. CM will determine admission criteria before discussing with spouse. This note will not be viewable in [address]

## 2017-11-02 ENCOUNTER — HOME CARE VISIT (OUTPATIENT)
Dept: HOME HEALTH SERVICES | Facility: HOME HEALTH | Age: 82
End: 2017-11-02
Payer: MEDICARE

## 2017-11-02 ENCOUNTER — PATIENT OUTREACH (OUTPATIENT)
Dept: CASE MANAGEMENT | Age: 82
End: 2017-11-02

## 2017-11-02 ENCOUNTER — HOME CARE VISIT (OUTPATIENT)
Dept: SCHEDULING | Facility: HOME HEALTH | Age: 82
End: 2017-11-02
Payer: MEDICARE

## 2017-11-02 VITALS
HEART RATE: 68 BPM | TEMPERATURE: 97.2 F | RESPIRATION RATE: 17 BRPM | DIASTOLIC BLOOD PRESSURE: 70 MMHG | SYSTOLIC BLOOD PRESSURE: 118 MMHG

## 2017-11-02 PROCEDURE — 3331090002 HH PPS REVENUE DEBIT

## 2017-11-02 PROCEDURE — 3331090001 HH PPS REVENUE CREDIT

## 2017-11-02 PROCEDURE — G0157 HHC PT ASSISTANT EA 15: HCPCS

## 2017-11-02 NOTE — PROGRESS NOTES
CM had not heard back from Nurse Manager with geropsychiatry unit at [facility]. CM called main line and spoke with nurse with unit and was told there is no admission criteria for patients to be admitted from home. As they have to do medical clearance first via the ED. CM called spouse and informed her of the unit, purpose, and services offered, and how to get connected. Spouse also reported she believes the patient has some form of dementia as well. Spouse wanted to talk it over with family and call CM back, if family does not decide this acute intervention, then CM will offer [facility] memory program, which is outpatient and requires appointment to be made as well as order from MD.    This note will not be viewable in [address]

## 2017-11-03 ENCOUNTER — HOME CARE VISIT (OUTPATIENT)
Dept: SCHEDULING | Facility: HOME HEALTH | Age: 82
End: 2017-11-03
Payer: MEDICARE

## 2017-11-03 VITALS
HEART RATE: 86 BPM | OXYGEN SATURATION: 96 % | RESPIRATION RATE: 20 BRPM | DIASTOLIC BLOOD PRESSURE: 70 MMHG | TEMPERATURE: 97.6 F | SYSTOLIC BLOOD PRESSURE: 130 MMHG

## 2017-11-03 PROCEDURE — 3331090002 HH PPS REVENUE DEBIT

## 2017-11-03 PROCEDURE — G0299 HHS/HOSPICE OF RN EA 15 MIN: HCPCS

## 2017-11-03 PROCEDURE — 3331090001 HH PPS REVENUE CREDIT

## 2017-11-04 ENCOUNTER — HOME CARE VISIT (OUTPATIENT)
Dept: SCHEDULING | Facility: HOME HEALTH | Age: 82
End: 2017-11-04
Payer: MEDICARE

## 2017-11-04 ENCOUNTER — APPOINTMENT (OUTPATIENT)
Dept: GENERAL RADIOLOGY | Age: 82
End: 2017-11-04
Attending: EMERGENCY MEDICINE
Payer: MEDICARE

## 2017-11-04 ENCOUNTER — HOSPITAL ENCOUNTER (EMERGENCY)
Age: 82
Discharge: OTHER INSTITUTION | End: 2017-11-06
Attending: EMERGENCY MEDICINE
Payer: MEDICARE

## 2017-11-04 VITALS
RESPIRATION RATE: 16 BRPM | OXYGEN SATURATION: 98 % | HEART RATE: 98 BPM | DIASTOLIC BLOOD PRESSURE: 80 MMHG | SYSTOLIC BLOOD PRESSURE: 180 MMHG | TEMPERATURE: 100.7 F

## 2017-11-04 DIAGNOSIS — G89.29 CHRONIC LOW BACK PAIN WITHOUT SCIATICA, UNSPECIFIED BACK PAIN LATERALITY: ICD-10-CM

## 2017-11-04 DIAGNOSIS — F03.91 DEMENTIA WITH BEHAVIORAL DISTURBANCE, UNSPECIFIED DEMENTIA TYPE: Primary | ICD-10-CM

## 2017-11-04 DIAGNOSIS — M54.50 CHRONIC LOW BACK PAIN WITHOUT SCIATICA, UNSPECIFIED BACK PAIN LATERALITY: ICD-10-CM

## 2017-11-04 DIAGNOSIS — K59.00 CONSTIPATION, UNSPECIFIED CONSTIPATION TYPE: ICD-10-CM

## 2017-11-04 LAB
ALBUMIN SERPL-MCNC: 3.2 G/DL (ref 3.2–4.6)
ALBUMIN/GLOB SERPL: 0.6 {RATIO} (ref 1.2–3.5)
ALP SERPL-CCNC: 91 U/L (ref 50–136)
ALT SERPL-CCNC: 18 U/L (ref 12–65)
ANION GAP SERPL CALC-SCNC: 10 MMOL/L (ref 7–16)
AST SERPL-CCNC: 17 U/L (ref 15–37)
BACTERIA URNS QL MICRO: 0 /HPF
BASOPHILS # BLD: 0 K/UL (ref 0–0.2)
BASOPHILS NFR BLD: 0 % (ref 0–2)
BILIRUB SERPL-MCNC: 0.5 MG/DL (ref 0.2–1.1)
BUN SERPL-MCNC: 16 MG/DL (ref 8–23)
CALCIUM SERPL-MCNC: 9.1 MG/DL (ref 8.3–10.4)
CASTS URNS QL MICRO: NORMAL /LPF
CHLORIDE SERPL-SCNC: 102 MMOL/L (ref 98–107)
CO2 SERPL-SCNC: 25 MMOL/L (ref 21–32)
CREAT SERPL-MCNC: 1.07 MG/DL (ref 0.8–1.5)
DIFFERENTIAL METHOD BLD: ABNORMAL
EOSINOPHIL # BLD: 0.1 K/UL (ref 0–0.8)
EOSINOPHIL NFR BLD: 1 % (ref 0.5–7.8)
EPI CELLS #/AREA URNS HPF: NORMAL /HPF
ERYTHROCYTE [DISTWIDTH] IN BLOOD BY AUTOMATED COUNT: 14.5 % (ref 11.9–14.6)
GLOBULIN SER CALC-MCNC: 5.5 G/DL (ref 2.3–3.5)
GLUCOSE SERPL-MCNC: 109 MG/DL (ref 65–100)
HCT VFR BLD AUTO: 33.9 % (ref 41.1–50.3)
HGB BLD-MCNC: 11.9 G/DL (ref 13.6–17.2)
IMM GRANULOCYTES # BLD: 0 K/UL (ref 0–0.5)
IMM GRANULOCYTES NFR BLD: 0 % (ref 0–5)
LYMPHOCYTES # BLD: 1.9 K/UL (ref 0.5–4.6)
LYMPHOCYTES NFR BLD: 32 % (ref 13–44)
MCH RBC QN AUTO: 32.1 PG (ref 26.1–32.9)
MCHC RBC AUTO-ENTMCNC: 35.1 G/DL (ref 31.4–35)
MCV RBC AUTO: 91.4 FL (ref 79.6–97.8)
MONOCYTES # BLD: 0.6 K/UL (ref 0.1–1.3)
MONOCYTES NFR BLD: 11 % (ref 4–12)
NEUTS SEG # BLD: 3.2 K/UL (ref 1.7–8.2)
NEUTS SEG NFR BLD: 56 % (ref 43–78)
PLATELET # BLD AUTO: 267 K/UL (ref 150–450)
PMV BLD AUTO: 10.5 FL (ref 10.8–14.1)
POTASSIUM SERPL-SCNC: 4 MMOL/L (ref 3.5–5.1)
PROT SERPL-MCNC: 8.7 G/DL (ref 6.3–8.2)
RBC # BLD AUTO: 3.71 M/UL (ref 4.23–5.67)
RBC #/AREA URNS HPF: NORMAL /HPF
SODIUM SERPL-SCNC: 137 MMOL/L (ref 136–145)
WBC # BLD AUTO: 5.7 K/UL (ref 4.3–11.1)
WBC URNS QL MICRO: NORMAL /HPF

## 2017-11-04 PROCEDURE — 74020 XR ABD (AP AND ERECT OR DECUB): CPT

## 2017-11-04 PROCEDURE — G0299 HHS/HOSPICE OF RN EA 15 MIN: HCPCS

## 2017-11-04 PROCEDURE — 81003 URINALYSIS AUTO W/O SCOPE: CPT | Performed by: EMERGENCY MEDICINE

## 2017-11-04 PROCEDURE — 3331090002 HH PPS REVENUE DEBIT

## 2017-11-04 PROCEDURE — 3331090003 HH PPS REVENUE ADJ

## 2017-11-04 PROCEDURE — 96372 THER/PROPH/DIAG INJ SC/IM: CPT | Performed by: EMERGENCY MEDICINE

## 2017-11-04 PROCEDURE — 3331090001 HH PPS REVENUE CREDIT

## 2017-11-04 PROCEDURE — 93005 ELECTROCARDIOGRAM TRACING: CPT | Performed by: EMERGENCY MEDICINE

## 2017-11-04 PROCEDURE — 85025 COMPLETE CBC W/AUTO DIFF WBC: CPT | Performed by: EMERGENCY MEDICINE

## 2017-11-04 PROCEDURE — 81015 MICROSCOPIC EXAM OF URINE: CPT | Performed by: EMERGENCY MEDICINE

## 2017-11-04 PROCEDURE — 99285 EMERGENCY DEPT VISIT HI MDM: CPT | Performed by: EMERGENCY MEDICINE

## 2017-11-04 PROCEDURE — 80053 COMPREHEN METABOLIC PANEL: CPT | Performed by: EMERGENCY MEDICINE

## 2017-11-04 RX ORDER — MAGNESIUM CITRATE
296 SOLUTION, ORAL ORAL
Status: ACTIVE | OUTPATIENT
Start: 2017-11-04 | End: 2017-11-05

## 2017-11-04 NOTE — ED TRIAGE NOTES
Pt arrived via ems. Pt has lower back pain. Ems reports the pt was combative yesterday, no bowel movement for 3 weeks, painful urination.
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
## 2017-11-05 PROCEDURE — 3331090001 HH PPS REVENUE CREDIT

## 2017-11-05 PROCEDURE — 3331090002 HH PPS REVENUE DEBIT

## 2017-11-05 PROCEDURE — 74011250637 HC RX REV CODE- 250/637: Performed by: EMERGENCY MEDICINE

## 2017-11-05 RX ORDER — CITALOPRAM 10 MG/1
10 TABLET ORAL
Status: DISCONTINUED | OUTPATIENT
Start: 2017-11-05 | End: 2017-11-06 | Stop reason: HOSPADM

## 2017-11-05 RX ORDER — LORAZEPAM 0.5 MG/1
0.5 TABLET ORAL
Status: DISCONTINUED | OUTPATIENT
Start: 2017-11-05 | End: 2017-11-06

## 2017-11-05 RX ADMIN — CITALOPRAM HYDROBROMIDE 10 MG: 10 TABLET ORAL at 22:03

## 2017-11-05 RX ADMIN — LORAZEPAM 0.5 MG: 0.5 TABLET ORAL at 11:51

## 2017-11-05 NOTE — ED NOTES
Pt has had 2 loose BM. Dr. Wellington Bartlett notified and he stated okay to hold mag citrate at this time.

## 2017-11-05 NOTE — ED NOTES
Family called and informed that telepsych would like them to be present during consult. Family stated they would be here shortly.

## 2017-11-05 NOTE — ED NOTES
Spoke with nurse at [facility]. Was told the pt was recommended for outpatient services yesterday/last night but that they would review the psychiatrists recommendations hand have their physician review them as well for possible admission.

## 2017-11-05 NOTE — DISCHARGE INSTRUCTIONS
Alzheimer's Disease: Care Instructions  Your Care Instructions    Alzheimer's disease is a type of dementia. It causes memory loss and affects judgment, language, and behavior. You may have trouble making decisions or may get lost in places that you used to know well. Alzheimer's disease is different than mild memory loss that occurs with aging. It is not clear what causes Alzheimer's disease, but it is the most common form of dementia in older adults. Finding out that you have this disease is a shock. You may be afraid and worried about how the condition will change your life. Although there is no cure at this time, medicine in some cases may slow memory loss for a while. Other medicines may be able to help you sleep or cope with depression and behavior changes. Alzheimer's disease is different for everyone. It may take many years to develop. In some cases, people can function well for a long time. In the early stage of the disease, you can do things at home to make life easier and safer. You also can keep doing your hobbies and other activities. Many people find comfort in planning now for their future needs. Follow-up care is a key part of your treatment and safety. Be sure to make and go to all appointments, and call your doctor if you are having problems. It's also a good idea to know your test results and keep a list of the medicines you take. How can you care for yourself at home? Taking care of yourself  · If your doctor gives you medicines, take them exactly as prescribed. Call your doctor if you think you are having a problem with your medicine. You will get more details on the medicines your doctor prescribes. · Eat a balanced diet. Get plenty of whole grains, fruits, and vegetables every day. If you are not hungry at mealtimes, eat snacks at midmorning and in the afternoon. Try drinks such as Boost, Ensure, or Sustacal if you are having trouble keeping your weight up. · Stay active.  Exercise such as walking may slow the decline of your mental abilities. Try to stay active mentally too. Read and work crossword puzzles if you enjoy these activities. · If you have trouble sleeping, do not nap during the day. Get regular exercise (but not within several hours of bedtime). Drink a glass of warm milk or caffeine-free herbal tea before going to bed. · Ask your doctor about support groups and other resources in your area. They can help people who have Alzheimer's disease and their families. · Be patient. You may find that a task takes you longer than it used to. · If you have not already done so, make a list of advance directives. Advance directives are instructions to your doctor and family members about what kind of care you want if you become unable to speak or express yourself. Talk to a  about making a will, if you do not already have one. Keeping schedules  · Develop a routine. You will feel less frustrated or confused if you have a clear, simple plan of what to do every day. ¨ Make lists of your medicines and when to take them. ¨ Write down appointments and other tasks in a calendar. ¨ Put sticky notes around the house to help you remember events and other things you have to do. ¨ Schedule activities and tasks for times of the day when you are best able to handle them. Staying safe  · Tell someone when you are going out and where you are going. Let the person know when you will be back. Before you go out alone, write down where you are going, how to get there, and how to get back home. Do this even if you have gone there many times before. Take someone along with you when possible. · Make your home safe. Tack down rugs, put no-slip tape in the tub, use handrails, and put safety switches on stoves and appliances. · Have a family member or other caregiver tell you whether you are driving badly. Deciding to stop driving is very hard for many people. Driving helps you feel independent.  Your state 's license bureau can do a driving test if there is any question. Plan for other means of getting around when you are no longer able to drive. · Use strong lighting, especially at night. Put night-lights in bedrooms, hallways, and bathrooms. · Lower the hot water temperature setting to 120°F or lower to avoid burns. When should you call for help? Call 911 anytime you think you may need emergency care. For example, call if:  ? · You are lost and do not know whom to call. ? · You are injured and do not know whom to call. ?Call your doctor now or seek immediate medical care if:  ? · Your symptoms suddenly get much worse. ? Watch closely for changes in your health, and be sure to contact your doctor if:  ? · You want more information about how you can take care of yourself. Where can you learn more? Go to http://katie-natalya.info/. Enter Y179 in the search box to learn more about \"Alzheimer's Disease: Care Instructions. \"  Current as of: May 12, 2017  Content Version: 11.4  © 2764-2211 [employer]. Care instructions adapted under license by [employer] (which disclaims liability or warranty for this information). If you have questions about a medical condition or this instruction, always ask your healthcare professional. [place] [address] any warranty or liability for your use of this information. Back Pain: Care Instructions  Your Care Instructions    Back pain has many possible causes. It is often related to problems with muscles and ligaments of the back. It may also be related to problems with the nerves, discs, or bones of the back. Moving, lifting, standing, sitting, or sleeping in an awkward way can strain the back. Sometimes you don't notice the injury until later. Arthritis is another common cause of back pain. Although it may hurt a lot, back pain usually improves on its own within several weeks.  Most people recover in 12 weeks or less. Using good home treatment and being careful not to stress your back can help you feel better sooner. Follow-up care is a key part of your treatment and safety. Be sure to make and go to all appointments, and call your doctor if you are having problems. It's also a good idea to know your test results and keep a list of the medicines you take. How can you care for yourself at home? · Sit or lie in positions that are most comfortable and reduce your pain. Try one of these positions when you lie down:  ¨ Lie on your back with your knees bent and supported by large pillows. ¨ Lie on the floor with your legs on the seat of a sofa or chair. Zoran  on your side with your knees and hips bent and a pillow between your legs. ¨ Lie on your stomach if it does not make pain worse. · Do not sit up in bed, and avoid soft couches and twisted positions. Bed rest can help relieve pain at first, but it delays healing. Avoid bed rest after the first day of back pain. · Change positions every 30 minutes. If you must sit for long periods of time, take breaks from sitting. Get up and walk around, or lie in a comfortable position. · Try using a heating pad on a low or medium setting for 15 to 20 minutes every 2 or 3 hours. Try a warm shower in place of one session with the heating pad. · You can also try an ice pack for 10 to 15 minutes every 2 to 3 hours. Put a thin cloth between the ice pack and your skin. · Take pain medicines exactly as directed. ¨ If the doctor gave you a prescription medicine for pain, take it as prescribed. ¨ If you are not taking a prescription pain medicine, ask your doctor if you can take an over-the-counter medicine. · Take short walks several times a day. You can start with 5 to 10 minutes, 3 or 4 times a day, and work up to longer walks. Walk on level surfaces and avoid hills and stairs until your back is better. · Return to work and other activities as soon as you can.  Continued rest without activity is usually not good for your back. · To prevent future back pain, do exercises to stretch and strengthen your back and stomach. Learn how to use good posture, safe lifting techniques, and proper body mechanics. When should you call for help? Call your doctor now or seek immediate medical care if:  ? · You have new or worsening numbness in your legs. ? · You have new or worsening weakness in your legs. (This could make it hard to stand up.)   ? · You lose control of your bladder or bowels. ? Watch closely for changes in your health, and be sure to contact your doctor if:  ? · Your pain gets worse. ? · You are not getting better after 2 weeks. Where can you learn more? Go to http://katie-natalya.info/. Enter J301 in the search box to learn more about \"Back Pain: Care Instructions. \"  Current as of: March 21, 2017  Content Version: 11.4  © 5149-5835 [employer]. Care instructions adapted under license by [employer] (which disclaims liability or warranty for this information). If you have questions about a medical condition or this instruction, always ask your healthcare professional. [place] [address] any warranty or liability for your use of this information.

## 2017-11-06 ENCOUNTER — HOME CARE VISIT (OUTPATIENT)
Dept: HOME HEALTH SERVICES | Facility: HOME HEALTH | Age: 82
End: 2017-11-06
Payer: MEDICARE

## 2017-11-06 VITALS
SYSTOLIC BLOOD PRESSURE: 111 MMHG | RESPIRATION RATE: 18 BRPM | HEART RATE: 76 BPM | HEIGHT: 69 IN | OXYGEN SATURATION: 94 % | TEMPERATURE: 98.2 F | BODY MASS INDEX: 36.43 KG/M2 | DIASTOLIC BLOOD PRESSURE: 72 MMHG | WEIGHT: 246 LBS

## 2017-11-06 LAB
ATRIAL RATE: 82 BPM
CALCULATED P AXIS, ECG09: 67 DEGREES
CALCULATED R AXIS, ECG10: 45 DEGREES
CALCULATED T AXIS, ECG11: 33 DEGREES
DIAGNOSIS, 93000: NORMAL
P-R INTERVAL, ECG05: 290 MS
Q-T INTERVAL, ECG07: 380 MS
QRS DURATION, ECG06: 102 MS
QTC CALCULATION (BEZET), ECG08: 443 MS
VENTRICULAR RATE, ECG03: 82 BPM

## 2017-11-06 PROCEDURE — 3331090001 HH PPS REVENUE CREDIT

## 2017-11-06 PROCEDURE — 74011250636 HC RX REV CODE- 250/636: Performed by: EMERGENCY MEDICINE

## 2017-11-06 PROCEDURE — 3331090002 HH PPS REVENUE DEBIT

## 2017-11-06 PROCEDURE — 74011250637 HC RX REV CODE- 250/637: Performed by: EMERGENCY MEDICINE

## 2017-11-06 RX ORDER — LORAZEPAM 1 MG/1
1 TABLET ORAL
Status: DISCONTINUED | OUTPATIENT
Start: 2017-11-06 | End: 2017-11-06 | Stop reason: HOSPADM

## 2017-11-06 RX ORDER — AMLODIPINE BESYLATE 10 MG/1
10 TABLET ORAL DAILY
Status: DISCONTINUED | OUTPATIENT
Start: 2017-11-06 | End: 2017-11-06 | Stop reason: HOSPADM

## 2017-11-06 RX ORDER — METFORMIN HYDROCHLORIDE 500 MG/1
500 TABLET ORAL DAILY
Status: DISCONTINUED | OUTPATIENT
Start: 2017-11-06 | End: 2017-11-06 | Stop reason: HOSPADM

## 2017-11-06 RX ORDER — LORAZEPAM 2 MG/ML
1 INJECTION INTRAMUSCULAR
Status: COMPLETED | OUTPATIENT
Start: 2017-11-06 | End: 2017-11-06

## 2017-11-06 RX ADMIN — LORAZEPAM 0.5 MG: 0.5 TABLET ORAL at 03:29

## 2017-11-06 RX ADMIN — LORAZEPAM 1 MG: 2 INJECTION, SOLUTION INTRAMUSCULAR; INTRAVENOUS at 09:14

## 2017-11-06 NOTE — ED NOTES
Pt resting quietly at this time with no signs of distress. Continuous video still in place. Sitter remains outside room.

## 2017-11-06 NOTE — ED NOTES
Pt continues to rest quietly in room with respirations even and unlabored with no sign of distress. Continuous video monitoring in place. Sitter outside of room.

## 2017-11-06 NOTE — ED NOTES
Pt up oob taking about \"Atilio putting up curtains\" and wanting to see mother. Pt reoriented to place and time and taking back to bed.  Pt provided with urinal.

## 2017-11-06 NOTE — ED NOTES
Report from [facility] for continuation of care. Pt resting quietly in room with respirations even and unlabored with no sign of distress. Continuous video monitoring in place.

## 2017-11-06 NOTE — ED NOTES
Patient lying in bed with eyes opened. This RN asked patient if he wanted to eat breakfast that is at bedside. Patient states \"nope. \" Sitter remains at door. Family present.

## 2017-11-06 NOTE — ED NOTES
Pt now resting quietly in bed. Respirations even and unlabored. No signs of distress noted at this time.   Continuous video monitoring in place,

## 2017-11-06 NOTE — ED NOTES
Patient resting in bed with door open. Sitter at bedside. Video monitoring in place. No distress observed.

## 2017-11-06 NOTE — ED NOTES
Sitter attempted to sit patient up in bed in order to eat breakfast tray when patient became extremely agitated and aggressive towards sitter, grabbing at her shirt and name badge. Patient refused to take ativan PO after incident. Verbal order received from Dr. Stacy Silva for IM ativan.

## 2017-11-06 NOTE — PROGRESS NOTES
Patient accepted to [facility]. Accepting: Dr. Liliana Brewster. Report to [phone] [address]. Julio Mehta is the Admissions Coordinator. SW updated charge and primary nurse. Also updated Home health SW.

## 2017-11-06 NOTE — ED NOTES
Patient resting in bed at this time. Respirations present. Sitter at door. No acute distress observed.

## 2017-11-06 NOTE — ED NOTES
Patient was medicated with Ativan 1 mg IM with no incident. Patient was indifferent to this RN. Patient refusing to answer questions from this RN.

## 2017-11-06 NOTE — PROGRESS NOTES
_[facility] Jeremy denied due to the acuity in there units. Patient is requesting [facility]. Waiting on response back from [facility] with [facility].

## 2017-11-06 NOTE — PROGRESS NOTES
Referrals sent to [facility] and [facility]. Both are reviewing and will call SW back.

## 2017-11-06 NOTE — ED NOTES
Orders for home medications placed per okay of Dr. Armin Randhawa.      Oxycodone 5mg counted with Viola Whitney RN  11.5 pills in oxycodone bottle     Lorazepam 1mg counted with Kenney Null  36 whole pills in lorazepam bottle as well as 6 partial pills and white powder

## 2017-11-06 NOTE — ED NOTES
[facility] is here to transport patient to [facility]. Care transferred at this time.

## 2017-11-06 NOTE — ED NOTES
Pt awake and getting oob. This RN went into pts room to see what he needed at that point pt became agitated and aggressive.   Pt redirected back to bed and will give ativan

## 2017-11-06 NOTE — ED NOTES
Family called to come  patient's medication. They will come first thing in the morning to come get them. They are aware they will be in the supervisors office.

## 2017-11-06 NOTE — ED NOTES
Patient ambulatory around ER at this time looking for his mother patient is reoriented to surroundings and clean dry sheets applied to bed--non slip socks placed on patient at this time--patient given ativan as ordered

## 2017-11-07 ENCOUNTER — PATIENT OUTREACH (OUTPATIENT)
Dept: CASE MANAGEMENT | Age: 82
End: 2017-11-07

## 2017-11-07 PROCEDURE — 3331090001 HH PPS REVENUE CREDIT

## 2017-11-07 PROCEDURE — 3331090002 HH PPS REVENUE DEBIT

## 2017-11-07 NOTE — PROGRESS NOTES
CM reviewed ED final documentation. Patient has in fact transferred to [place].  CM spoke with spouse, she and the family has been able to see the patient and will be able to visit. CM and spouse lightly discussed possible plans for transition once therapy completed. CM again provided spouse with contact information and informed CM will call to review progress of patient, CM lightly discussed [facility] memory care program too, if therapy does not work out for patient. Spouse did report that the patient has mentioned the fact that he may need to go to a nursing home. This note will not be viewable in [address]

## 2017-11-08 ENCOUNTER — PATIENT OUTREACH (OUTPATIENT)
Dept: CASE MANAGEMENT | Age: 82
End: 2017-11-08

## 2017-11-08 ENCOUNTER — HOME CARE VISIT (OUTPATIENT)
Dept: HOME HEALTH SERVICES | Facility: HOME HEALTH | Age: 82
End: 2017-11-08
Payer: MEDICARE

## 2017-11-08 ENCOUNTER — HOSPITAL ENCOUNTER (EMERGENCY)
Age: 82
Discharge: STILL ADMITTED OR ADMINISTRATIVE | End: 2017-11-08
Attending: EMERGENCY MEDICINE
Payer: MEDICARE

## 2017-11-08 PROCEDURE — 3331090002 HH PPS REVENUE DEBIT

## 2017-11-08 PROCEDURE — 75810000275 HC EMERGENCY DEPT VISIT NO LEVEL OF CARE: Performed by: EMERGENCY MEDICINE

## 2017-11-08 PROCEDURE — 3331090001 HH PPS REVENUE CREDIT

## 2017-11-08 NOTE — PROGRESS NOTES
Per [facility] patient discharged to [facility] on 11/06/2017. Patient is currently engaged with Ginny Prader, RN CCM, per connect care notes CCM scheduled follow up call on 11/07/2017. This note will not be viewable in [address]

## 2017-11-09 ENCOUNTER — PATIENT OUTREACH (OUTPATIENT)
Dept: CASE MANAGEMENT | Age: 82
End: 2017-11-09

## 2017-11-09 PROCEDURE — 3331090002 HH PPS REVENUE DEBIT

## 2017-11-09 PROCEDURE — 3331090001 HH PPS REVENUE CREDIT

## 2017-11-09 NOTE — PROGRESS NOTES
This note will not be viewable in [address] Patient engaged with RN case manager, Delayne Alpers. Will forward chart. Will close my case.

## 2017-11-09 NOTE — PROGRESS NOTES
CM reviewed record. CM spoke with patient's spouse. Spouse reported she spoke with SW at [facility] today and patient was not sent out to the ED. They are going to visit patient tonight and bring food over as well. CM unsure what caused the ED visit on 11/8 to occur as patient remains at [address]. This note will not be viewable in [address]

## 2017-11-10 PROCEDURE — 3331090001 HH PPS REVENUE CREDIT

## 2017-11-10 PROCEDURE — 3331090002 HH PPS REVENUE DEBIT

## 2017-11-11 PROCEDURE — 3331090002 HH PPS REVENUE DEBIT

## 2017-11-11 PROCEDURE — 3331090001 HH PPS REVENUE CREDIT

## 2017-11-12 PROCEDURE — 3331090002 HH PPS REVENUE DEBIT

## 2017-11-12 PROCEDURE — 3331090001 HH PPS REVENUE CREDIT

## 2017-11-13 PROCEDURE — 3331090001 HH PPS REVENUE CREDIT

## 2017-11-13 PROCEDURE — 3331090002 HH PPS REVENUE DEBIT

## 2017-11-14 PROCEDURE — 3331090001 HH PPS REVENUE CREDIT

## 2017-11-14 PROCEDURE — 3331090002 HH PPS REVENUE DEBIT

## 2017-11-15 PROCEDURE — 3331090002 HH PPS REVENUE DEBIT

## 2017-11-15 PROCEDURE — 3331090001 HH PPS REVENUE CREDIT

## 2017-11-16 ENCOUNTER — PATIENT OUTREACH (OUTPATIENT)
Dept: CASE MANAGEMENT | Age: 82
End: 2017-11-16

## 2017-11-16 PROCEDURE — 3331090001 HH PPS REVENUE CREDIT

## 2017-11-16 PROCEDURE — 3331090002 HH PPS REVENUE DEBIT

## 2017-11-16 NOTE — PROGRESS NOTES
JUVENAL RN reviewed record, JUVENAL RN attempted outreach, no answer, JUVENAL RN will attempt at later date, if spouse does not return call        This note will not be viewable in [address]

## 2017-11-17 PROCEDURE — 3331090001 HH PPS REVENUE CREDIT

## 2017-11-17 PROCEDURE — 3331090002 HH PPS REVENUE DEBIT

## 2017-11-18 PROCEDURE — 3331090001 HH PPS REVENUE CREDIT

## 2017-11-18 PROCEDURE — 3331090002 HH PPS REVENUE DEBIT

## 2017-11-19 PROCEDURE — 3331090001 HH PPS REVENUE CREDIT

## 2017-11-19 PROCEDURE — 3331090002 HH PPS REVENUE DEBIT

## 2017-11-20 ENCOUNTER — PATIENT OUTREACH (OUTPATIENT)
Dept: CASE MANAGEMENT | Age: 82
End: 2017-11-20

## 2017-11-20 PROCEDURE — 3331090001 HH PPS REVENUE CREDIT

## 2017-11-20 PROCEDURE — 3331090002 HH PPS REVENUE DEBIT

## 2017-11-20 NOTE — PROGRESS NOTES
This note will not be viewable in [address] This RNCM, covering for [address] [address] [facility], spoke w/ pt's wife who states pt is transferring today from [place] to [facility] and [address] [place] [address]. RNCM notified Vandana Duong 15.

## 2017-11-21 PROCEDURE — 3331090002 HH PPS REVENUE DEBIT

## 2017-11-21 PROCEDURE — 3331090001 HH PPS REVENUE CREDIT

## 2017-11-22 PROCEDURE — 3331090002 HH PPS REVENUE DEBIT

## 2017-11-22 PROCEDURE — 3331090001 HH PPS REVENUE CREDIT

## 2017-11-23 PROCEDURE — 3331090001 HH PPS REVENUE CREDIT

## 2017-11-23 PROCEDURE — 3331090002 HH PPS REVENUE DEBIT

## 2017-11-24 PROCEDURE — 3331090002 HH PPS REVENUE DEBIT

## 2017-11-24 PROCEDURE — 3331090001 HH PPS REVENUE CREDIT

## 2017-11-25 PROCEDURE — 3331090002 HH PPS REVENUE DEBIT

## 2017-11-25 PROCEDURE — 3331090001 HH PPS REVENUE CREDIT

## 2017-11-26 PROCEDURE — 3331090001 HH PPS REVENUE CREDIT

## 2017-11-26 PROCEDURE — 3331090002 HH PPS REVENUE DEBIT

## 2017-11-27 ENCOUNTER — PATIENT OUTREACH (OUTPATIENT)
Dept: CASE MANAGEMENT | Age: 82
End: 2017-11-27

## 2017-11-27 LAB
BASOPHILS # BLD: 0 K/UL (ref 0–0.2)
BASOPHILS NFR BLD: 0 % (ref 0–2)
DIFFERENTIAL METHOD BLD: ABNORMAL
EOSINOPHIL # BLD: 0.1 K/UL (ref 0–0.8)
EOSINOPHIL NFR BLD: 2 % (ref 0.5–7.8)
ERYTHROCYTE [DISTWIDTH] IN BLOOD BY AUTOMATED COUNT: 14.4 % (ref 11.9–14.6)
HCT VFR BLD AUTO: 31.6 % (ref 41.1–50.3)
HGB BLD-MCNC: 11 G/DL (ref 13.6–17.2)
IMM GRANULOCYTES # BLD: 0 K/UL (ref 0–0.5)
LYMPHOCYTES # BLD: 1.4 K/UL (ref 0.5–4.6)
LYMPHOCYTES NFR BLD: 23 % (ref 13–44)
MCH RBC QN AUTO: 32.3 PG (ref 26.1–32.9)
MCHC RBC AUTO-ENTMCNC: 34.8 G/DL (ref 31.4–35)
MCV RBC AUTO: 92.7 FL (ref 79.6–97.8)
MONOCYTES # BLD: 0.7 K/UL (ref 0.1–1.3)
MONOCYTES NFR BLD: 11 % (ref 4–12)
NEUTS SEG # BLD: 4.1 K/UL (ref 1.7–8.2)
NEUTS SEG NFR BLD: 64 % (ref 43–78)
PLATELET # BLD AUTO: 157 K/UL (ref 150–450)
PMV BLD AUTO: 11.1 FL (ref 10.8–14.1)
RBC # BLD AUTO: 3.41 M/UL (ref 4.23–5.67)
WBC # BLD AUTO: 6.3 K/UL (ref 4.3–11.1)

## 2017-11-27 PROCEDURE — 3331090001 HH PPS REVENUE CREDIT

## 2017-11-27 PROCEDURE — 3331090002 HH PPS REVENUE DEBIT

## 2017-11-27 NOTE — PROGRESS NOTES
CM returning from vacation. CM called and confirmed patient remains at SNF for rehab, patient in room 107      This note will not be viewable in [address]

## 2017-11-28 PROCEDURE — 3331090002 HH PPS REVENUE DEBIT

## 2017-11-28 PROCEDURE — 3331090001 HH PPS REVENUE CREDIT

## 2017-11-29 PROCEDURE — 3331090002 HH PPS REVENUE DEBIT

## 2017-11-29 PROCEDURE — 3331090001 HH PPS REVENUE CREDIT

## 2017-11-30 PROCEDURE — 3331090002 HH PPS REVENUE DEBIT

## 2017-11-30 PROCEDURE — 3331090001 HH PPS REVENUE CREDIT

## 2017-12-01 ENCOUNTER — PATIENT OUTREACH (OUTPATIENT)
Dept: CASE MANAGEMENT | Age: 82
End: 2017-12-01

## 2017-12-01 PROCEDURE — 3331090001 HH PPS REVENUE CREDIT

## 2017-12-01 PROCEDURE — 3331090002 HH PPS REVENUE DEBIT

## 2017-12-01 NOTE — PROGRESS NOTES
CM attempted to reach spouse, but no success, CM left vm    CM left vm with SS/DC planner at facility. Nurse on unit confirmed pt remains at the facility. This note will not be viewable in [address]

## 2017-12-02 PROCEDURE — 3331090002 HH PPS REVENUE DEBIT

## 2017-12-02 PROCEDURE — 3331090001 HH PPS REVENUE CREDIT

## 2017-12-03 PROCEDURE — 3331090001 HH PPS REVENUE CREDIT

## 2017-12-03 PROCEDURE — 3331090002 HH PPS REVENUE DEBIT

## 2017-12-04 PROCEDURE — 3331090002 HH PPS REVENUE DEBIT

## 2017-12-04 PROCEDURE — 3331090001 HH PPS REVENUE CREDIT

## 2017-12-05 PROCEDURE — 3331090001 HH PPS REVENUE CREDIT

## 2017-12-05 PROCEDURE — 3331090002 HH PPS REVENUE DEBIT

## 2017-12-06 PROCEDURE — 3331090001 HH PPS REVENUE CREDIT

## 2017-12-06 PROCEDURE — 3331090002 HH PPS REVENUE DEBIT

## 2017-12-07 PROCEDURE — 3331090001 HH PPS REVENUE CREDIT

## 2017-12-07 PROCEDURE — 3331090002 HH PPS REVENUE DEBIT

## 2017-12-08 PROCEDURE — 3331090001 HH PPS REVENUE CREDIT

## 2017-12-08 PROCEDURE — 3331090002 HH PPS REVENUE DEBIT

## 2017-12-09 PROCEDURE — 3331090001 HH PPS REVENUE CREDIT

## 2017-12-09 PROCEDURE — 3331090002 HH PPS REVENUE DEBIT

## 2017-12-10 PROCEDURE — 3331090001 HH PPS REVENUE CREDIT

## 2017-12-10 PROCEDURE — 3331090002 HH PPS REVENUE DEBIT

## 2017-12-10 PROCEDURE — 3331090003 HH PPS REVENUE ADJ

## 2017-12-13 ENCOUNTER — PATIENT OUTREACH (OUTPATIENT)
Dept: CASE MANAGEMENT | Age: 82
End: 2017-12-13

## 2017-12-13 NOTE — PROGRESS NOTES
JUVENAL informed by Nakia 15, [address] who does SNF network calls, that patient should discharge today. CM noted PCP appointment already set for 12/20. CM attempted to reach spouse, CM left vm, requesting return call    CM will continue to attempt and attempt to arrange HV after appointment with Dr. Jackquline Paget    CM received returned call while on lunch, no vm left. CM returned call, son answered and reported he is aware of the appointment next week, patient still weak, with poor appetite and son reported hh was not ordered. CM immediately ended call and called SNF. CM spoke with Brady Hernandez, she reported [facility] with [facility] was ordered and [facility] was ordered as well. SM informed by Brady Hernandez, the family wanted the patient to stay long term, but was resistant to completing the medicaid application. Brady Hernandez talked to them about [facility], but wanted Medicaid to pay for that too. CM returned call to son, informed that [facility] was in fact ordered and he will receive a call from them as well as [facility]. CM explained that until [facility] starts the service, he must take the prescription to the pharmacy and get refills for what was not provided to them as leftover from the SNF, son reported understanding. JUVENAL RN submitted referral for palliative care. This note will not be viewable in [address]

## 2017-12-14 ENCOUNTER — PATIENT OUTREACH (OUTPATIENT)
Dept: CASE MANAGEMENT | Age: 82
End: 2017-12-14

## 2017-12-14 NOTE — PROGRESS NOTES
CM received email from [facility], she will meet with the family Friday morning at 11:00am to accomodate the son's work schedule. This note will not be viewable in [address]

## 2017-12-15 ENCOUNTER — PATIENT OUTREACH (OUTPATIENT)
Dept: CASE MANAGEMENT | Age: 82
End: 2017-12-15

## 2017-12-15 NOTE — PROGRESS NOTES
CM called [facility], to ensure there was no mix ups when they called to schedule a time day for services to start, as the family seems to not understand or even gets confused. CM explained to [facility] that palliative care is scheduled to come out today,, [facility] RN is scheduled to call today to arrange HV. Palliative care will update CM RN with HV status once completed. This note will not be viewable in [address]

## 2017-12-20 PROBLEM — F51.01 PRIMARY INSOMNIA: Chronic | Status: ACTIVE | Noted: 2017-12-20

## 2017-12-20 PROBLEM — F02.80 ALZHEIMER'S DEMENTIA WITHOUT BEHAVIORAL DISTURBANCE (HCC): Chronic | Status: ACTIVE | Noted: 2017-12-20

## 2017-12-20 PROBLEM — G30.9 ALZHEIMER'S DEMENTIA WITHOUT BEHAVIORAL DISTURBANCE (HCC): Status: ACTIVE | Noted: 2017-12-20

## 2017-12-20 PROBLEM — N40.0 BENIGN PROSTATIC HYPERPLASIA: Status: ACTIVE | Noted: 2017-12-20

## 2017-12-20 PROBLEM — F51.01 PRIMARY INSOMNIA: Status: ACTIVE | Noted: 2017-12-20

## 2017-12-20 PROBLEM — N40.0 BENIGN PROSTATIC HYPERPLASIA: Chronic | Status: ACTIVE | Noted: 2017-12-20

## 2017-12-20 PROBLEM — K59.03 DRUG INDUCED CONSTIPATION: Status: ACTIVE | Noted: 2017-12-20

## 2017-12-20 PROBLEM — F02.80 ALZHEIMER'S DEMENTIA WITHOUT BEHAVIORAL DISTURBANCE (HCC): Status: ACTIVE | Noted: 2017-12-20

## 2017-12-20 PROBLEM — K21.9 GERD WITHOUT ESOPHAGITIS: Status: ACTIVE | Noted: 2017-12-20

## 2017-12-20 PROBLEM — K21.9 GERD WITHOUT ESOPHAGITIS: Chronic | Status: ACTIVE | Noted: 2017-12-20

## 2017-12-20 PROBLEM — K59.03 DRUG INDUCED CONSTIPATION: Chronic | Status: ACTIVE | Noted: 2017-12-20

## 2017-12-20 PROBLEM — G30.9 ALZHEIMER'S DEMENTIA WITHOUT BEHAVIORAL DISTURBANCE (HCC): Chronic | Status: ACTIVE | Noted: 2017-12-20

## 2017-12-21 ENCOUNTER — PATIENT OUTREACH (OUTPATIENT)
Dept: CASE MANAGEMENT | Age: 82
End: 2017-12-21

## 2017-12-21 NOTE — PROGRESS NOTES
CM reviewed office notes. CM noted medication list was updates and no major issues on visit. Patient can be seen back in a month's time. Patient and family has signed consent for palliative care with hi and is scheduled to be seen. JUVENAL called , [facility], patient is being seen by a RN and PT. This note will not be viewable in [address]

## 2017-12-27 ENCOUNTER — PATIENT OUTREACH (OUTPATIENT)
Dept: CASE MANAGEMENT | Age: 82
End: 2017-12-27

## 2017-12-27 NOTE — PROGRESS NOTES
CM reviewed record. JUVENAL spoke with [facility], PT and was informed the spouse wanted information on the [facility], from the SW, which was a resource provided to spouse by JUVENAL RN and SW from rehab facility. JUVENAL spoke with son. CM spoke with spouse. Spouse is interested in services to help her clean the home. This note will not be viewable in [address]

## 2017-12-29 ENCOUNTER — HOME HEALTH ADMISSION (OUTPATIENT)
Dept: HOME HEALTH SERVICES | Facility: HOME HEALTH | Age: 82
End: 2017-12-29

## 2018-01-03 ENCOUNTER — PATIENT OUTREACH (OUTPATIENT)
Dept: CASE MANAGEMENT | Age: 83
End: 2018-01-03

## 2018-01-03 NOTE — PROGRESS NOTES
CM reviewed record. CM spoke with spouse. CM reviewed careeverywhere and noted the patient had an ED visit for constipation. CM and spouse discussed issues with visits to the ED for patient's living with dementia. CM and spouse discussed implications for ED use, calling [facility] team, and calling Dr. Aydee Starks office for triage. Spouse reported HH is checking his sugars, and they have been well, she herself has not checked patient's sugars in years. CM called [facility] to discuss concerns, as CM explained to spouse that [facility] is temporary. CM spoke with [address] [place] [address], no checks to be done, and have not been done, with Dr. Joy Hooper, confirmation as a1c was 6. Patient continues to be seen by [facility]. Care. CM and spouse discussed options for patient to remain independent with medication managements with safeguards. Son listened in on call. Son denied concerns about anything, but then mentioned the SNF misplaced the patient's teeth, SNF is [facility]. Son had not called back to get update, CM encouraged son to do so. CM also called SNF, SNF working on coordinating patient to see affordable dentures, and has son's number to return call today. This note will not be viewable in [address]

## 2018-01-04 ENCOUNTER — PATIENT OUTREACH (OUTPATIENT)
Dept: CASE MANAGEMENT | Age: 83
End: 2018-01-04

## 2018-01-04 NOTE — PROGRESS NOTES
CM received a call from [facility] Nurse with [facility], patient having issues with behaviors and the spouse reported she was afraid, [facility] Nurse recommended that she call the police. CM spoke with son to try to gather more information about the current conditions, at that time the police were at the home, son expressed wanting to place the patient. CM explained this takes time, Medicaid application. JUVENAL spoke with SW about the patient and history. Conclusion made that the patient should be evaluated in person by a psychiatrist and they may have to admit again to [facility]. CM attempted to call son back, but he did not answer. CM called spouse. CM was informed that the behavior was triggered. The son and patient began to argue about his medications, as patient wants to mange his medications himself without help, but wife reported the patient has taking too many before and this is why the son helps. CM again explained that a compromise has to be made. That allowing the patient to do it himself with someone watching and prompting him. CM attempted to call [facility] Nurse back, but no success and unable to leave vm    CM would like to have  CM help with Medicaid application, as patient and family seem to have issues with understanding health care lingo, no matter how much explained. CM will discuss with KAREN SANFORD whether [facility] memory program would be of benefit to the patient and family as well. Son and spouse has had dementia care education from [facility] team and JUVENAL RN. CM updated [facility] Nurse. CM spoke with KAREN SANFORD, she does think [facility] memory program would be of benefit to the patient and family. CM left vm with son to explained [facility] memory program, no need for ED since patient is calm now, CM left vm with medicaid office number and address. Due to family's history and issues with understanding, it is best if the family received support of office staff to apply for Medicaid.     JUVENAL spoke with Dr. Zane Opitz orders submitted for HH SW and S memory program.     CM updated spouse on information. CM updated pall. Care team as well. This note will not be viewable in [address]

## 2018-01-05 ENCOUNTER — PATIENT OUTREACH (OUTPATIENT)
Dept: CASE MANAGEMENT | Age: 83
End: 2018-01-05

## 2018-01-06 NOTE — PROGRESS NOTES
SW CM alerted to this case by RN CM - patient had been in SNF for rehab -family very limited in understanding what they had been requested to do and process involved (family had refused to assist with patient applying for max term Medicaid for SNF (did not understand that application was only based upon patient's income/assets and though family's had to be included, patient has been discharged home, family cannot manage care or behavior - now wants patient back long term in SNF - 30 day window from STR D/C ends on 1/13 - [facility] involved with case and this SW did contact RN on case Donovanthiago Vaishnavi, T# [phone]) - she stated that a new RN CM would be assuming the case week of 1/8 but she had spoken with the SW assigned to case Patric Smiley, T# [phone]) re: 30 day window from last SNF admission expiring on 1/13 - to pursue a new admission for STR based upon available days left before patient will have to pusue Medicaid pending application to SNF from community) - family will be able to work with SW at [facility] to facilitate Medicaid application for long term care if time frame is addressed - this SW CM has updated RN CM on status on case and will contact [facility] SW on 1/8 to determine if she has started herfacility search.

## 2018-01-09 ENCOUNTER — PATIENT OUTREACH (OUTPATIENT)
Dept: CASE MANAGEMENT | Age: 83
End: 2018-01-09

## 2018-01-09 NOTE — Clinical Note
What is the status with you and being able to connect with Home health SW. ..you may have to call main office Phone: [phone] to connect with her.   Mr. Augustus Osorio will not be able to see memory care team until next month

## 2018-01-09 NOTE — PROGRESS NOTES
CM called and confirmed patient does have appointment with [facility] outpatient memory care program on 2/1/18 1030am, CM informed Dr. Fadumo Kramer office was notified. CM attempted to call spouse, but no success. CM will connect with  CM, to determine if she had success with connecting with  with [facility] to work on 30 day readmit into SNF. This note will not be viewable in [address]

## 2018-01-10 NOTE — PROGRESS NOTES
KAREN SANFORD has attempted contact with [facility] KAREN Cheismael Burk - T# [phone] ) and left messages x2 on cell phone (on both 1/8 and 1/9/18) expressing urgency of placing patient in SNF short-long term prior to end of 30-day window on 1/13 - this KAREN SANFORD has requested c/b and received none as of yet - will attempt 3rd telephone call on 1/10 (Wednesday)  - this information has been communicated to RN CM today in update on case status.

## 2018-01-11 ENCOUNTER — PATIENT OUTREACH (OUTPATIENT)
Dept: CASE MANAGEMENT | Age: 83
End: 2018-01-11

## 2018-01-11 NOTE — PROGRESS NOTES
CM reviewed record. CM was able to reach wife. CM was not able to gather much information from spouse, but she did inform CM that she allowed patient to take his own medication last night and it did not go well last night, patient took an extra dose of something, so now patient has agreed to let his wife do his medications. [facility] continues, but wife can not confirm that SW has seen patient at the home, but she knows therapy and SN has seen patient. Patient to have teeth returned to home today, which is a new set. She did inform CM that they have agreed to apply for VIRY. CM decided to try son to gather hard facts. CM was able to reach son. CM explained the 30 day SNF readmit option. Son stated they do want to apply for Medicaid. CM asked if he received her messages, he stated he had, but he has so many. CM re explained information left on the vm. CM also texted the information to him as he requested. Son reported he would call SNF to talk with them. CM will attempt to reach out to SW since there has been no success in reaching SW with [facility], try to see if SW will be willing to connect with SNF directly. This note will not be viewable in [address]

## 2018-01-25 ENCOUNTER — PATIENT OUTREACH (OUTPATIENT)
Dept: CASE MANAGEMENT | Age: 83
End: 2018-01-25

## 2018-01-25 NOTE — PROGRESS NOTES
CM reviewed record, it appears patient's status and conditions are stable. CM attempted to reach spouse, no success on this attempt. This note will not be viewable in [address]

## 2018-01-31 ENCOUNTER — PATIENT OUTREACH (OUTPATIENT)
Dept: CASE MANAGEMENT | Age: 83
End: 2018-01-31

## 2018-01-31 NOTE — PROGRESS NOTES
CM spoke with spouse Aviva Gonsales. They plan to attend appointment tomorrow with [facility]. CM provided directions on how to find the building and their telephone number. This note will not be viewable in [address]

## 2018-02-07 ENCOUNTER — PATIENT OUTREACH (OUTPATIENT)
Dept: CASE MANAGEMENT | Age: 83
End: 2018-02-07

## 2018-02-07 NOTE — PROGRESS NOTES
CM reviewed care everywhere, patient has made appointment with Geriatric team at [facility], it appears that a referral for CLTC was placed, CM called and spoke with spouse of patient, she was not able to provide much details, but to say they were informed to pursue Medicaid; which is the same message they have received since patient's stay at the SNF. Spouse decided to end the call, patient was going to be fitted for his teeth at the dentist.       This note will not be viewable in [address]

## 2018-02-21 ENCOUNTER — PATIENT OUTREACH (OUTPATIENT)
Dept: CASE MANAGEMENT | Age: 83
End: 2018-02-21

## 2018-02-21 NOTE — PROGRESS NOTES
CM RN reviewed record, no major changes during visit. Patient is connected with [facility] and has connected with [facility] Geriatrics team, both goals met. [facility] Geriatrics team has referred to CLTC/Medicaid and REACH program, in also offers support. At this time there is no evidence to suggest that further CCM services are needed. No further outreaches being scheduled at this time. This note will not be viewable in [address]

## 2018-05-03 PROBLEM — G30.1 LATE ONSET ALZHEIMER'S DISEASE WITH BEHAVIORAL DISTURBANCE (HCC): Status: ACTIVE | Noted: 2018-05-03

## 2018-05-03 PROBLEM — G30.1 LATE ONSET ALZHEIMER'S DISEASE WITH BEHAVIORAL DISTURBANCE (HCC): Chronic | Status: ACTIVE | Noted: 2018-05-03

## 2018-05-03 PROBLEM — F02.818 LATE ONSET ALZHEIMER'S DISEASE WITH BEHAVIORAL DISTURBANCE (HCC): Status: ACTIVE | Noted: 2018-05-03

## 2018-05-03 PROBLEM — F02.818 LATE ONSET ALZHEIMER'S DISEASE WITH BEHAVIORAL DISTURBANCE (HCC): Chronic | Status: ACTIVE | Noted: 2018-05-03

## 2018-11-28 PROBLEM — E11.21 TYPE 2 DIABETES WITH NEPHROPATHY (HCC): Status: ACTIVE | Noted: 2018-11-28

## 2021-01-01 ENCOUNTER — ANESTHESIA EVENT (OUTPATIENT)
Dept: SURGERY | Age: 86
DRG: 259 | End: 2021-01-01
Payer: MEDICARE

## 2021-01-01 ENCOUNTER — ANESTHESIA (OUTPATIENT)
Dept: SURGERY | Age: 86
DRG: 259 | End: 2021-01-01
Payer: MEDICARE

## 2021-01-01 ENCOUNTER — HOSPITAL ENCOUNTER (INPATIENT)
Age: 86
LOS: 1 days | Discharge: HOME | DRG: 259 | End: 2021-05-06
Attending: INTERNAL MEDICINE | Admitting: PHYSICIAN ASSISTANT
Payer: MEDICARE

## 2021-01-01 VITALS
HEART RATE: 90 BPM | OXYGEN SATURATION: 94 % | SYSTOLIC BLOOD PRESSURE: 164 MMHG | DIASTOLIC BLOOD PRESSURE: 88 MMHG | RESPIRATION RATE: 18 BRPM | TEMPERATURE: 98 F | BODY MASS INDEX: 29.49 KG/M2 | HEIGHT: 69 IN | WEIGHT: 199.08 LBS

## 2021-01-01 DIAGNOSIS — F02.80 LATE ONSET ALZHEIMER'S DEMENTIA WITHOUT BEHAVIORAL DISTURBANCE (HCC): Chronic | ICD-10-CM

## 2021-01-01 DIAGNOSIS — I49.5 SSS (SICK SINUS SYNDROME) (HCC): ICD-10-CM

## 2021-01-01 DIAGNOSIS — G30.1 LATE ONSET ALZHEIMER'S DEMENTIA WITHOUT BEHAVIORAL DISTURBANCE (HCC): Chronic | ICD-10-CM

## 2021-01-01 DIAGNOSIS — I45.9 STOKES-ADAMS SYNCOPE: ICD-10-CM

## 2021-01-01 DIAGNOSIS — N18.32 STAGE 3B CHRONIC KIDNEY DISEASE (HCC): Chronic | ICD-10-CM

## 2021-01-01 DIAGNOSIS — Z95.0 PACEMAKER: Chronic | ICD-10-CM

## 2021-01-01 DIAGNOSIS — E11.9 CONTROLLED TYPE 2 DIABETES MELLITUS WITHOUT COMPLICATION, WITHOUT LONG-TERM CURRENT USE OF INSULIN (HCC): Chronic | ICD-10-CM

## 2021-01-01 LAB
ANION GAP SERPL CALC-SCNC: 4 MMOL/L (ref 7–16)
ANION GAP SERPL CALC-SCNC: 7 MMOL/L (ref 7–16)
APPEARANCE UR: ABNORMAL
ATRIAL RATE: 83 BPM
BACTERIA SPEC CULT: NORMAL
BACTERIA URNS QL MICRO: 0 /HPF
BASOPHILS # BLD: 0 K/UL (ref 0–0.2)
BASOPHILS # BLD: 0 K/UL (ref 0–0.2)
BASOPHILS NFR BLD: 0 % (ref 0–2)
BASOPHILS NFR BLD: 0 % (ref 0–2)
BILIRUB UR QL: NEGATIVE
BUN SERPL-MCNC: 27 MG/DL (ref 8–23)
BUN SERPL-MCNC: 31 MG/DL (ref 8–23)
CALCIUM SERPL-MCNC: 8.3 MG/DL (ref 8.3–10.4)
CALCIUM SERPL-MCNC: 8.5 MG/DL (ref 8.3–10.4)
CALCULATED R AXIS, ECG10: -32 DEGREES
CALCULATED T AXIS, ECG11: 86 DEGREES
CASTS URNS QL MICRO: ABNORMAL /LPF
CHLORIDE SERPL-SCNC: 106 MMOL/L (ref 98–107)
CHLORIDE SERPL-SCNC: 109 MMOL/L (ref 98–107)
CO2 SERPL-SCNC: 25 MMOL/L (ref 21–32)
CO2 SERPL-SCNC: 26 MMOL/L (ref 21–32)
COLOR UR: YELLOW
CREAT SERPL-MCNC: 1.6 MG/DL (ref 0.8–1.5)
CREAT SERPL-MCNC: 1.63 MG/DL (ref 0.8–1.5)
DIAGNOSIS, 93000: NORMAL
DIFFERENTIAL METHOD BLD: ABNORMAL
DIFFERENTIAL METHOD BLD: ABNORMAL
EOSINOPHIL # BLD: 0 K/UL (ref 0–0.8)
EOSINOPHIL # BLD: 0.1 K/UL (ref 0–0.8)
EOSINOPHIL NFR BLD: 1 % (ref 0.5–7.8)
EOSINOPHIL NFR BLD: 1 % (ref 0.5–7.8)
EPI CELLS #/AREA URNS HPF: ABNORMAL /HPF
ERYTHROCYTE [DISTWIDTH] IN BLOOD BY AUTOMATED COUNT: 14 % (ref 11.9–14.6)
ERYTHROCYTE [DISTWIDTH] IN BLOOD BY AUTOMATED COUNT: 14.3 % (ref 11.9–14.6)
FERRITIN SERPL-MCNC: 144 NG/ML (ref 8–388)
GLUCOSE BLD STRIP.AUTO-MCNC: 106 MG/DL (ref 65–100)
GLUCOSE BLD STRIP.AUTO-MCNC: 111 MG/DL (ref 65–100)
GLUCOSE BLD STRIP.AUTO-MCNC: 118 MG/DL (ref 65–100)
GLUCOSE BLD STRIP.AUTO-MCNC: 135 MG/DL (ref 65–100)
GLUCOSE BLD STRIP.AUTO-MCNC: 95 MG/DL (ref 65–100)
GLUCOSE SERPL-MCNC: 96 MG/DL (ref 65–100)
GLUCOSE SERPL-MCNC: 97 MG/DL (ref 65–100)
GLUCOSE UR STRIP.AUTO-MCNC: NEGATIVE MG/DL
HCT VFR BLD AUTO: 23.6 % (ref 41.1–50.3)
HCT VFR BLD AUTO: 26.3 % (ref 41.1–50.3)
HGB BLD-MCNC: 8.1 G/DL (ref 13.6–17.2)
HGB BLD-MCNC: 8.9 G/DL (ref 13.6–17.2)
HGB UR QL STRIP: ABNORMAL
IMM GRANULOCYTES # BLD AUTO: 0 K/UL (ref 0–0.5)
IMM GRANULOCYTES # BLD AUTO: 0 K/UL (ref 0–0.5)
IMM GRANULOCYTES NFR BLD AUTO: 0 % (ref 0–5)
IMM GRANULOCYTES NFR BLD AUTO: 0 % (ref 0–5)
IRON SERPL-MCNC: 37 UG/DL (ref 35–150)
KETONES UR QL STRIP.AUTO: NEGATIVE MG/DL
LEUKOCYTE ESTERASE UR QL STRIP.AUTO: ABNORMAL
LYMPHOCYTES # BLD: 1 K/UL (ref 0.5–4.6)
LYMPHOCYTES # BLD: 1.2 K/UL (ref 0.5–4.6)
LYMPHOCYTES NFR BLD: 22 % (ref 13–44)
LYMPHOCYTES NFR BLD: 23 % (ref 13–44)
MCH RBC QN AUTO: 34.8 PG (ref 26.1–32.9)
MCH RBC QN AUTO: 34.9 PG (ref 26.1–32.9)
MCHC RBC AUTO-ENTMCNC: 33.8 G/DL (ref 31.4–35)
MCHC RBC AUTO-ENTMCNC: 34.3 G/DL (ref 31.4–35)
MCV RBC AUTO: 101.7 FL (ref 79.6–97.8)
MCV RBC AUTO: 102.7 FL (ref 79.6–97.8)
MONOCYTES # BLD: 0.4 K/UL (ref 0.1–1.3)
MONOCYTES # BLD: 0.5 K/UL (ref 0.1–1.3)
MONOCYTES NFR BLD: 10 % (ref 4–12)
MONOCYTES NFR BLD: 9 % (ref 4–12)
NEUTS SEG # BLD: 3 K/UL (ref 1.7–8.2)
NEUTS SEG # BLD: 3.4 K/UL (ref 1.7–8.2)
NEUTS SEG NFR BLD: 66 % (ref 43–78)
NEUTS SEG NFR BLD: 68 % (ref 43–78)
NITRITE UR QL STRIP.AUTO: NEGATIVE
NRBC # BLD: 0 K/UL (ref 0–0.2)
NRBC # BLD: 0 K/UL (ref 0–0.2)
PH UR STRIP: 6.5 [PH] (ref 5–9)
PLATELET # BLD AUTO: 141 K/UL (ref 150–450)
PLATELET # BLD AUTO: 150 K/UL (ref 150–450)
PMV BLD AUTO: 11.2 FL (ref 9.4–12.3)
PMV BLD AUTO: 11.3 FL (ref 9.4–12.3)
POTASSIUM SERPL-SCNC: 3.9 MMOL/L (ref 3.5–5.1)
POTASSIUM SERPL-SCNC: 3.9 MMOL/L (ref 3.5–5.1)
PROT UR STRIP-MCNC: 30 MG/DL
Q-T INTERVAL, ECG07: 392 MS
QRS DURATION, ECG06: 106 MS
QTC CALCULATION (BEZET), ECG08: 457 MS
RBC # BLD AUTO: 2.32 M/UL (ref 4.23–5.6)
RBC # BLD AUTO: 2.56 M/UL (ref 4.23–5.6)
RBC #/AREA URNS HPF: ABNORMAL /HPF
SERVICE CMNT-IMP: ABNORMAL
SERVICE CMNT-IMP: NORMAL
SERVICE CMNT-IMP: NORMAL
SODIUM SERPL-SCNC: 138 MMOL/L (ref 138–145)
SODIUM SERPL-SCNC: 139 MMOL/L (ref 138–145)
SP GR UR REFRACTOMETRY: 1.02 (ref 1–1.02)
TSH SERPL DL<=0.005 MIU/L-ACNC: 3.06 UIU/ML (ref 0.36–3.74)
UROBILINOGEN UR QL STRIP.AUTO: 0.2 EU/DL (ref 0.2–1)
VENTRICULAR RATE, ECG03: 82 BPM
WBC # BLD AUTO: 4.4 K/UL (ref 4.3–11.1)
WBC # BLD AUTO: 5.2 K/UL (ref 4.3–11.1)
WBC URNS QL MICRO: ABNORMAL /HPF

## 2021-01-01 PROCEDURE — 84443 ASSAY THYROID STIM HORMONE: CPT

## 2021-01-01 PROCEDURE — 96372 THER/PROPH/DIAG INJ SC/IM: CPT

## 2021-01-01 PROCEDURE — 77030031139 HC SUT VCRL2 J&J -A

## 2021-01-01 PROCEDURE — 99218 HC RM OBSERVATION: CPT

## 2021-01-01 PROCEDURE — 77030033067 HC SUT PDO STRATFX SPIR J&J -B

## 2021-01-01 PROCEDURE — 74011250637 HC RX REV CODE- 250/637: Performed by: INTERNAL MEDICINE

## 2021-01-01 PROCEDURE — 36415 COLL VENOUS BLD VENIPUNCTURE: CPT

## 2021-01-01 PROCEDURE — 87086 URINE CULTURE/COLONY COUNT: CPT

## 2021-01-01 PROCEDURE — 74011250636 HC RX REV CODE- 250/636: Performed by: INTERNAL MEDICINE

## 2021-01-01 PROCEDURE — 74011250637 HC RX REV CODE- 250/637: Performed by: NURSE PRACTITIONER

## 2021-01-01 PROCEDURE — 77030041279 HC DRSG PRMSL AG MDII -B

## 2021-01-01 PROCEDURE — 74011250636 HC RX REV CODE- 250/636: Performed by: NURSE ANESTHETIST, CERTIFIED REGISTERED

## 2021-01-01 PROCEDURE — 74011000250 HC RX REV CODE- 250: Performed by: NURSE ANESTHETIST, CERTIFIED REGISTERED

## 2021-01-01 PROCEDURE — 74011250636 HC RX REV CODE- 250/636: Performed by: NURSE PRACTITIONER

## 2021-01-01 PROCEDURE — 74011000272 HC RX REV CODE- 272: Performed by: INTERNAL MEDICINE

## 2021-01-01 PROCEDURE — 74011250636 HC RX REV CODE- 250/636: Performed by: ANESTHESIOLOGY

## 2021-01-01 PROCEDURE — 80048 BASIC METABOLIC PNL TOTAL CA: CPT

## 2021-01-01 PROCEDURE — 93005 ELECTROCARDIOGRAM TRACING: CPT | Performed by: INTERNAL MEDICINE

## 2021-01-01 PROCEDURE — 33228 REMV&REPLC PM GEN DUAL LEAD: CPT | Performed by: INTERNAL MEDICINE

## 2021-01-01 PROCEDURE — 85025 COMPLETE CBC W/AUTO DIFF WBC: CPT

## 2021-01-01 PROCEDURE — 74011000250 HC RX REV CODE- 250: Performed by: INTERNAL MEDICINE

## 2021-01-01 PROCEDURE — 0JPT0PZ REMOVAL OF CARDIAC RHYTHM RELATED DEVICE FROM TRUNK SUBCUTANEOUS TISSUE AND FASCIA, OPEN APPROACH: ICD-10-PCS | Performed by: INTERNAL MEDICINE

## 2021-01-01 PROCEDURE — 33228 REMV&REPLC PM GEN DUAL LEAD: CPT

## 2021-01-01 PROCEDURE — 76060000033 HC ANESTHESIA 1 TO 1.5 HR: Performed by: INTERNAL MEDICINE

## 2021-01-01 PROCEDURE — 0JH606Z INSERTION OF PACEMAKER, DUAL CHAMBER INTO CHEST SUBCUTANEOUS TISSUE AND FASCIA, OPEN APPROACH: ICD-10-PCS | Performed by: INTERNAL MEDICINE

## 2021-01-01 PROCEDURE — 96374 THER/PROPH/DIAG INJ IV PUSH: CPT

## 2021-01-01 PROCEDURE — 82728 ASSAY OF FERRITIN: CPT

## 2021-01-01 PROCEDURE — 77030010507 HC ADH SKN DERMBND J&J -B

## 2021-01-01 PROCEDURE — C1785 PMKR, DUAL, RATE-RESP: HCPCS

## 2021-01-01 PROCEDURE — 82962 GLUCOSE BLOOD TEST: CPT

## 2021-01-01 PROCEDURE — 83540 ASSAY OF IRON: CPT

## 2021-01-01 PROCEDURE — 77030031304 HC WAVWGD EIGR DISP INVO -D

## 2021-01-01 PROCEDURE — 77030032060 HC PWDR HEMSTAT ARISTA ASRB 3GM BARD -C

## 2021-01-01 PROCEDURE — 65660000000 HC RM CCU STEPDOWN

## 2021-01-01 PROCEDURE — 99024 POSTOP FOLLOW-UP VISIT: CPT | Performed by: INTERNAL MEDICINE

## 2021-01-01 PROCEDURE — 81001 URINALYSIS AUTO W/SCOPE: CPT

## 2021-01-01 RX ORDER — TAMSULOSIN HYDROCHLORIDE 0.4 MG/1
0.4 CAPSULE ORAL DAILY
Status: DISCONTINUED | OUTPATIENT
Start: 2021-01-01 | End: 2021-01-01 | Stop reason: HOSPADM

## 2021-01-01 RX ORDER — METFORMIN HYDROCHLORIDE 500 MG/1
500 TABLET, FILM COATED, EXTENDED RELEASE ORAL DAILY
COMMUNITY

## 2021-01-01 RX ORDER — NITROGLYCERIN 0.4 MG/1
0.4 TABLET SUBLINGUAL
Status: DISCONTINUED | OUTPATIENT
Start: 2021-01-01 | End: 2021-01-01 | Stop reason: HOSPADM

## 2021-01-01 RX ORDER — QUETIAPINE FUMARATE 100 MG/1
100 TABLET, FILM COATED ORAL
Status: DISCONTINUED | OUTPATIENT
Start: 2021-01-01 | End: 2021-01-01 | Stop reason: HOSPADM

## 2021-01-01 RX ORDER — FENTANYL CITRATE 50 UG/ML
INJECTION, SOLUTION INTRAMUSCULAR; INTRAVENOUS AS NEEDED
Status: DISCONTINUED | OUTPATIENT
Start: 2021-01-01 | End: 2021-01-01 | Stop reason: HOSPADM

## 2021-01-01 RX ORDER — SODIUM CHLORIDE 9 MG/ML
75 INJECTION, SOLUTION INTRAVENOUS ONCE
Status: DISPENSED | OUTPATIENT
Start: 2021-01-01 | End: 2021-01-01

## 2021-01-01 RX ORDER — SODIUM CHLORIDE, SODIUM LACTATE, POTASSIUM CHLORIDE, CALCIUM CHLORIDE 600; 310; 30; 20 MG/100ML; MG/100ML; MG/100ML; MG/100ML
100 INJECTION, SOLUTION INTRAVENOUS CONTINUOUS
Status: DISCONTINUED | OUTPATIENT
Start: 2021-01-01 | End: 2021-01-01

## 2021-01-01 RX ORDER — SODIUM CHLORIDE 0.9 % (FLUSH) 0.9 %
5-40 SYRINGE (ML) INJECTION EVERY 8 HOURS
Status: DISCONTINUED | OUTPATIENT
Start: 2021-01-01 | End: 2021-01-01 | Stop reason: HOSPADM

## 2021-01-01 RX ORDER — LIDOCAINE HYDROCHLORIDE 20 MG/ML
INJECTION, SOLUTION EPIDURAL; INFILTRATION; INTRACAUDAL; PERINEURAL AS NEEDED
Status: DISCONTINUED | OUTPATIENT
Start: 2021-01-01 | End: 2021-01-01 | Stop reason: HOSPADM

## 2021-01-01 RX ORDER — OXYCODONE HYDROCHLORIDE 5 MG/1
5 TABLET ORAL
Status: DISCONTINUED | OUTPATIENT
Start: 2021-01-01 | End: 2021-01-01

## 2021-01-01 RX ORDER — CEPHALEXIN 500 MG/1
500 CAPSULE ORAL EVERY 8 HOURS
Qty: 21 CAP | Refills: 0 | Status: SHIPPED | OUTPATIENT
Start: 2021-01-01

## 2021-01-01 RX ORDER — PROPOFOL 10 MG/ML
INJECTION, EMULSION INTRAVENOUS
Status: DISCONTINUED | OUTPATIENT
Start: 2021-01-01 | End: 2021-01-01 | Stop reason: HOSPADM

## 2021-01-01 RX ORDER — ONDANSETRON 2 MG/ML
4 INJECTION INTRAMUSCULAR; INTRAVENOUS
Status: DISCONTINUED | OUTPATIENT
Start: 2021-01-01 | End: 2021-01-01 | Stop reason: HOSPADM

## 2021-01-01 RX ORDER — ONDANSETRON 2 MG/ML
4 INJECTION INTRAMUSCULAR; INTRAVENOUS
Status: DISCONTINUED | OUTPATIENT
Start: 2021-01-01 | End: 2021-01-01

## 2021-01-01 RX ORDER — CLONAZEPAM 0.5 MG/1
0.25 TABLET ORAL 3 TIMES DAILY
Status: DISCONTINUED | OUTPATIENT
Start: 2021-01-01 | End: 2021-01-01 | Stop reason: HOSPADM

## 2021-01-01 RX ORDER — CEPHALEXIN 500 MG/1
500 CAPSULE ORAL EVERY 8 HOURS
Status: DISCONTINUED | OUTPATIENT
Start: 2021-01-01 | End: 2021-01-01 | Stop reason: HOSPADM

## 2021-01-01 RX ORDER — DOCUSATE SODIUM 100 MG/1
100 CAPSULE, LIQUID FILLED ORAL
Status: DISCONTINUED | OUTPATIENT
Start: 2021-01-01 | End: 2021-01-01 | Stop reason: HOSPADM

## 2021-01-01 RX ORDER — PROPOFOL 10 MG/ML
INJECTION, EMULSION INTRAVENOUS AS NEEDED
Status: DISCONTINUED | OUTPATIENT
Start: 2021-01-01 | End: 2021-01-01 | Stop reason: HOSPADM

## 2021-01-01 RX ORDER — CEPHALEXIN 500 MG/1
500 CAPSULE ORAL EVERY 12 HOURS
Status: DISCONTINUED | OUTPATIENT
Start: 2021-01-01 | End: 2021-01-01

## 2021-01-01 RX ORDER — ACETAMINOPHEN 325 MG/1
650 TABLET ORAL
Status: DISCONTINUED | OUTPATIENT
Start: 2021-01-01 | End: 2021-01-01

## 2021-01-01 RX ORDER — HYDRALAZINE HYDROCHLORIDE 20 MG/ML
10 INJECTION INTRAMUSCULAR; INTRAVENOUS
Status: DISCONTINUED | OUTPATIENT
Start: 2021-01-01 | End: 2021-01-01 | Stop reason: HOSPADM

## 2021-01-01 RX ORDER — AMLODIPINE BESYLATE 5 MG/1
5 TABLET ORAL DAILY
COMMUNITY

## 2021-01-01 RX ORDER — HYDROMORPHONE HYDROCHLORIDE 1 MG/ML
0.2 INJECTION, SOLUTION INTRAMUSCULAR; INTRAVENOUS; SUBCUTANEOUS
Status: DISCONTINUED | OUTPATIENT
Start: 2021-01-01 | End: 2021-01-01

## 2021-01-01 RX ORDER — HYDROCODONE BITARTRATE AND ACETAMINOPHEN 5; 325 MG/1; MG/1
1 TABLET ORAL
Qty: 12 TAB | Refills: 0 | Status: SHIPPED | OUTPATIENT
Start: 2021-01-01 | End: 2021-01-01

## 2021-01-01 RX ORDER — INSULIN LISPRO 100 [IU]/ML
INJECTION, SOLUTION INTRAVENOUS; SUBCUTANEOUS
Status: DISCONTINUED | OUTPATIENT
Start: 2021-01-01 | End: 2021-01-01 | Stop reason: HOSPADM

## 2021-01-01 RX ORDER — BENAZEPRIL HYDROCHLORIDE 40 MG/1
40 TABLET ORAL DAILY
COMMUNITY

## 2021-01-01 RX ORDER — HALOPERIDOL 5 MG/ML
2.5 INJECTION INTRAMUSCULAR ONCE
Status: COMPLETED | OUTPATIENT
Start: 2021-01-01 | End: 2021-01-01

## 2021-01-01 RX ORDER — NALOXONE HYDROCHLORIDE 0.4 MG/ML
0.04 INJECTION, SOLUTION INTRAMUSCULAR; INTRAVENOUS; SUBCUTANEOUS
Status: DISCONTINUED | OUTPATIENT
Start: 2021-01-01 | End: 2021-01-01 | Stop reason: HOSPADM

## 2021-01-01 RX ORDER — LIDOCAINE HYDROCHLORIDE 10 MG/ML
0.1 INJECTION INFILTRATION; PERINEURAL AS NEEDED
Status: DISCONTINUED | OUTPATIENT
Start: 2021-01-01 | End: 2021-01-01 | Stop reason: HOSPADM

## 2021-01-01 RX ORDER — SODIUM CHLORIDE 0.9 % (FLUSH) 0.9 %
5-40 SYRINGE (ML) INJECTION AS NEEDED
Status: DISCONTINUED | OUTPATIENT
Start: 2021-01-01 | End: 2021-01-01 | Stop reason: HOSPADM

## 2021-01-01 RX ORDER — MEMANTINE HYDROCHLORIDE 5 MG/1
5 TABLET ORAL DAILY
Status: DISCONTINUED | OUTPATIENT
Start: 2021-01-01 | End: 2021-01-01

## 2021-01-01 RX ORDER — DOXEPIN HYDROCHLORIDE 25 MG/1
25 CAPSULE ORAL
Status: DISCONTINUED | OUTPATIENT
Start: 2021-01-01 | End: 2021-01-01

## 2021-01-01 RX ORDER — CEFAZOLIN SODIUM/WATER 2 G/20 ML
2 SYRINGE (ML) INTRAVENOUS ONCE
Status: COMPLETED | OUTPATIENT
Start: 2021-01-01 | End: 2021-01-01

## 2021-01-01 RX ORDER — ACETAMINOPHEN 325 MG/1
650 TABLET ORAL
Status: DISCONTINUED | OUTPATIENT
Start: 2021-01-01 | End: 2021-01-01 | Stop reason: HOSPADM

## 2021-01-01 RX ORDER — HYDROCODONE BITARTRATE AND ACETAMINOPHEN 5; 325 MG/1; MG/1
1 TABLET ORAL
Status: DISCONTINUED | OUTPATIENT
Start: 2021-01-01 | End: 2021-01-01 | Stop reason: HOSPADM

## 2021-01-01 RX ORDER — ESCITALOPRAM OXALATE 10 MG/1
10 TABLET ORAL DAILY
Status: DISCONTINUED | OUTPATIENT
Start: 2021-01-01 | End: 2021-01-01 | Stop reason: HOSPADM

## 2021-01-01 RX ADMIN — Medication 10 ML: at 21:35

## 2021-01-01 RX ADMIN — SODIUM CHLORIDE, SODIUM LACTATE, POTASSIUM CHLORIDE, AND CALCIUM CHLORIDE: 600; 310; 30; 20 INJECTION, SOLUTION INTRAVENOUS at 13:10

## 2021-01-01 RX ADMIN — TAMSULOSIN HYDROCHLORIDE 0.4 MG: 0.4 CAPSULE ORAL at 08:27

## 2021-01-01 RX ADMIN — FENTANYL CITRATE 25 MCG: 50 INJECTION INTRAMUSCULAR; INTRAVENOUS at 13:45

## 2021-01-01 RX ADMIN — CLONAZEPAM 0.25 MG: 0.5 TABLET ORAL at 08:27

## 2021-01-01 RX ADMIN — CEPHALEXIN 500 MG: 500 CAPSULE ORAL at 08:30

## 2021-01-01 RX ADMIN — PHENYLEPHRINE HYDROCHLORIDE 100 MCG: 10 INJECTION INTRAVENOUS at 13:36

## 2021-01-01 RX ADMIN — QUETIAPINE FUMARATE 100 MG: 100 TABLET ORAL at 04:15

## 2021-01-01 RX ADMIN — HALOPERIDOL LACTATE 2.5 MG: 5 INJECTION, SOLUTION INTRAMUSCULAR at 23:58

## 2021-01-01 RX ADMIN — LIDOCAINE HYDROCHLORIDE 40 MG: 20 INJECTION, SOLUTION EPIDURAL; INFILTRATION; INTRACAUDAL; PERINEURAL at 13:16

## 2021-01-01 RX ADMIN — CEFAZOLIN 2 G: 1 INJECTION, POWDER, FOR SOLUTION INTRAVENOUS at 13:24

## 2021-01-01 RX ADMIN — ESCITALOPRAM OXALATE 10 MG: 10 TABLET ORAL at 08:27

## 2021-01-01 RX ADMIN — LIDOCAINE HYDROCHLORIDE 300 MG: 10; .005 INJECTION, SOLUTION EPIDURAL; INFILTRATION; INTRACAUDAL; PERINEURAL at 13:23

## 2021-01-01 RX ADMIN — CLONAZEPAM 0.25 MG: 0.5 TABLET ORAL at 21:32

## 2021-01-01 RX ADMIN — PROPOFOL 50 MCG/KG/MIN: 10 INJECTION, EMULSION INTRAVENOUS at 13:17

## 2021-01-01 RX ADMIN — CEPHALEXIN 500 MG: 500 CAPSULE ORAL at 16:26

## 2021-01-01 RX ADMIN — FENTANYL CITRATE 25 MCG: 50 INJECTION INTRAMUSCULAR; INTRAVENOUS at 13:13

## 2021-01-01 RX ADMIN — Medication 10 ML: at 00:10

## 2021-01-01 RX ADMIN — NEOMYCIN AND POLYMYXIN B SULFATES 1000 ML: 40; 200000 SOLUTION IRRIGATION at 13:23

## 2021-01-01 RX ADMIN — HYDRALAZINE HYDROCHLORIDE 10 MG: 20 INJECTION, SOLUTION INTRAMUSCULAR; INTRAVENOUS at 00:07

## 2021-01-01 RX ADMIN — CEPHALEXIN 500 MG: 500 CAPSULE ORAL at 21:33

## 2021-01-01 RX ADMIN — CEPHALEXIN 500 MG: 500 CAPSULE ORAL at 06:27

## 2021-01-01 RX ADMIN — TAMSULOSIN HYDROCHLORIDE 0.4 MG: 0.4 CAPSULE ORAL at 08:30

## 2021-01-01 RX ADMIN — CLONAZEPAM 0.25 MG: 0.5 TABLET ORAL at 16:27

## 2021-01-01 RX ADMIN — ESCITALOPRAM OXALATE 10 MG: 10 TABLET ORAL at 08:30

## 2021-01-01 RX ADMIN — CLONAZEPAM 0.25 MG: 0.5 TABLET ORAL at 08:29

## 2021-01-01 RX ADMIN — PROPOFOL 25 MG: 10 INJECTION, EMULSION INTRAVENOUS at 13:16

## 2021-05-04 PROBLEM — R55 SYNCOPE: Status: ACTIVE | Noted: 2021-01-01

## 2021-05-05 NOTE — H&P
[facility] Cardiology History & Physical      Date of  Admission: No admission date for patient encounter. Primary Care Physician: Dr. Galindo Francisco  Primary Cardiologist: Dr. Miguel Acevedo Physician: Dr. Benito [place]    CC: syncope    HPI:  Luz Harrison is a 80 y.o. 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 Patient had his last device interrogation in 2/2020 that showed KAMAR. He was scheduled for generator change but this was cancelled due to COVID-19 outbreak. Per ER documentation, patient had a syncopal episode after eating dinner. Associated symptoms included vomiting. EMS was called to patient' home. Per report from ED staff, an attempt was made to interrogate patient's device but was not able to be done because battery was dead. Patient was transferred to [facility] facility and admitted to telemetry. Patient does not remember syncopal episode earlier today. Denies any complaints at this time. No family present at this time.       Past Medical History:   Diagnosis Date    Anxiety 12/8/2015    BPH (benign prostatic hypertrophy) 1/30/2011    CKD (chronic kidney disease) stage 3, GFR 30-59 ml/min ([address] 1/30/2011    Diabetes mellitus type 2, controlled ([address] 12/8/2015    HTN (hypertension) 10/15/2009    Hyperlipemia, mixed 12/8/2015    Pacemaker ([employer] Jonathan PM placed 1/31/2011) 2/1/2011    SSS (sick sinus syndrome) ([address]       Past Surgical History:   Procedure Laterality Date    HX APPENDECTOMY      HX ORTHOPAEDIC      HX OTHER SURGICAL  2008    transrectal resection of carcinoid tumor    HX PACEMAKER      Pacemaker 2/2011    HX TURP  2011    NEUROLOGICAL PROCEDURE UNLISTED         Allergies   Allergen Reactions    Doxazosin Other (comments)     hypotension      Social History     Socioeconomic History    Marital status:      Spouse name: Not on file    Number of children: Not on file    Years of education: Not on file    Highest education level: Not on file   Occupational History    Not on file   Social Needs    Financial resource strain: Not on file    Food insecurity     Worry: Not on file     Inability: Not on file    Transportation needs     Medical: Not on file     Non-medical: Not on file   Tobacco Use    Smoking status: Never Smoker    Smokeless tobacco: Never Used   Substance and Sexual Activity    Alcohol use: No    Drug use: No    Sexual activity: Never   Lifestyle    Physical activity     Days per week: Not on file     Minutes per session: Not on file    Stress: Not on file   Relationships    Social connections     Talks on phone: Not on file     Gets together: Not on file     Attends Congregation service: Not on file     Active member of club or organization: Not on file     Attends meetings of clubs or organizations: Not on file     Relationship status: Not on file    Intimate partner violence     Fear of current or ex partner: Not on file     Emotionally abused: Not on file     Physically abused: Not on file     Forced sexual activity: Not on file   Other Topics Concern    Not on file   Social History Narrative    Not on file     No family history on file. No current facility-administered medications for this encounter. Current Outpatient Medications   Medication Sig    QUEtiapine (SEROQUEL) 50 mg tablet 1-2 qhs for agitation  (updated quantity)    doxepin (SINEQUAN) 25 mg capsule 1 qhs for sleep   (updated quantity)    clonazePAM (KLONOPIN) 0.5 mg tablet 1/2-1 qhs to tid for anxiety    escitalopram oxalate (LEXAPRO) 10 mg tablet 1 every day for anxiety control    tamsulosin (FLOMAX) 0.4 mg capsule 1 every day for prostate    memantine (NAMENDA) 5 mg tablet 1 bid for memory    lidocaine (LIDODERM) 5 % Apply patch to the affected area for 12 hours a day and remove for 12 hours a day.     polyethylene glycol (MIRALAX) 17 gram packet 1 packet in juice daily to prevent constipation       Review of Systems    Review of Systems   Unable to perform ROS: Dementia          Subjective:     Visit Vitals  BP (!) 198/83 (BP 1 Location: Right upper arm, BP Patient Position: At rest) Comment: Jacklyn, RN aware   Pulse 62   Temp 97.6 °F (36.4 °C)   Resp 18   Wt 90.2 kg (198 lb 13.7 oz)   SpO2 99%   BMI 29.37 kg/m²     Physical Exam  HENT:      Mouth/Throat:      Mouth: Mucous membranes are moist.   Eyes:      Pupils: Pupils are equal, round, and reactive to light. Cardiovascular:      Rate and Rhythm: Normal rate and regular rhythm. Heart sounds: Normal heart sounds. Pulmonary:      Breath sounds: Normal breath sounds. Abdominal:      General: Bowel sounds are normal.   Musculoskeletal:         General: No swelling. Skin:     General: Skin is dry. Neurological:      Mental Status: He is alert. Mental status is at baseline. Psychiatric:         Mood and Affect: Mood normal.         Cardiographics  Telemetry: normal sinus rhythm  ECG: ordered  Echocardiogram: last done 2011    Labs: from [facility] ER: WBC 5.9, HGB 9.2, HCT 27.1, plt 160, sodium 137, potassium 4.5, BUN 34, creatinine 1.93, glucose 121, troponin <0.02, UA + WBC and moderate leuks    Patient has been seen and examined by Dr. Jigna Burden and he agrees with the following assessment and plan:     Assessment/Plan:        Syncope -- admit to telemetry. Complete bedrest overnight. Plan for PM gen change in the AM. NPO after midnight. Attempt to have device interrogated in the AM. EMS transport reported intermittent pacing during transfer from ER to [facility] for admission. HTN (hypertension) -- elevated on admission. IV hydralazine prn overnight. Overview: Controlled, benign      CKD (chronic kidney disease) stage 3, GFR 30-59 ml/min -- stable. Continue to monitor      Pacemaker ([employer] PM placed 1/31/2011) -- see above.       Diabetes mellitus type 2, controlled -- controlled on bloodwork. Diabetic diet when able to eat, add SSI ACHS for glucose monitoring and control. Hyperlipemia, mixed -- not on statin therapy. UTI -- given IV rocephin in the ER. Was prescribed Keflex 500mg BID for 7 days.  Continue on admission starting tomorrow        Elina Leavitt NP  5/4/2021 10:21 PM

## 2021-05-05 NOTE — PROGRESS NOTES
Patient has the following mediation bottles in a plastic bag that came with him. - lexapro 10 mg  - amlodipine 5 mg  - seroquel 50 mg  - benazepril 40 mg  - clonazepam 0.5mg  - tamsulosin 0.4 mg  - metformin 500 mg    Counted clonazepam with 2nd RN and locked all medications in the narc lock box in the nursing station. Paper record is on the paper chart.

## 2021-05-05 NOTE — ROUTINE PROCESS
Verbal bedside report given to Jose Miguel Moser oncoming RN. Patient's situation, background, assessment and recommendations provided. Opportunity for questions provided. Oncoming RN assumed care of patient.

## 2021-05-05 NOTE — PROCEDURES
: Jovi Palmer. Fadumo Werner MD    REFERRING: Self    Pre-Procedure Diagnosis  1. PPM generator KAMAR. 2. Documented non-reversible symptomatic bradycardia due to sinus node dysfunction. Procedure Performed  1. Dual Chamber PPM Gen Change    Anesthesia: MAC     Complications: None     Estimated Blood Loss: Less than 10 mL     Fluoroscopy Exposure: 0 minutes    Specimens: * No specimens in log *     Patient Information and Indications: The procedure, indications, risks, benefits, and alternatives were discussed with the patient and family members, who desired to proceed after questions were answered and informed consent was documented. Procedure: After informed consent was obtained, the patient was brought to the Electrophysiology Laboratory in a fasting state and was prepped and draped in sterile fashion. Prophylactic antibiotic was administered prior to skin incision. Local anesthetic (sensorcaine) was delivered to the left pectoral region and an incision was made directly over the lower prior surgical scar. The subcutaneous pocket was opened using blunt dissection and electrocautery, and adequate hemostasis was established. The device was freed from overlying fibrotic tissue and the leads freed to give enough slack for device exchange. The leads were individually removed from the old generator and tested using the PSA revealing excellent pacing parameters. The lead pins were then cleaned with antibiotic soaked gauze, dried gently, and attached to a new pacemaker generator. Pins were directly observed to pass the tip electrode, and the ring hex wrench screws were secured, and leads tug tested. The device and leads were gently positioned within the pocket. The pocket was irrigated copiously with a saline antimicrobial solution prior to device positioning. The device and leads were tested a second time prior to pocket closure.   The wound was closed with two layers of absorbable suture (3-0 Monocryl, Stratafix) followed by skin closure with 4-0 Vicryl in a running fashion. The patient tolerated the procedure well and left the lab in good condition. There were no complications. All sharps and sponges were counted x 2. Device and Lead Information  Pulse Generator Model #  Serial # Location Implant   [id number]  [id number] [facility] [id number]  [id number] Left Pectoral EXPLANT  IMPLANT       Lead Model Number  Serial Number Lead position Implant   RA 1944/46 [facility] [id number] RA Appendage CHRONIC     RV 2088TC/52 [facility] [id number] RV Samson CHRONIC       Lead Sensitivity and Threshold  Lead R or P sensitivity (mv) Threshold (V) Threshold PW (msec) Impedance (ohms) Final output Voltage (V) Final PW (msec)   RA 1.5 0.5 0.4 430 2.0 .40   RV 8.3 1.5 0.4 310 2.5 .40              Bradycardia Settings  Mayank Mode LRL URL Pace AVD (ms) Sense AVD (ms) Rate Response Mode Switching Mode SW Rate   DDDR 60 110 225 200 On On 180       Impression: 1. Successful dual chamber PPM generator replacement. Plan: Overnight observation.  -Complete oral abx.   -Routine CIED instructions.  -Device clinic follow up in 1-2 weeks. Mae Hutchins MD, [address]  Clinical Cardiac Electrophysiology  [facility] Cardiology    5/5/2021  2:05 PM

## 2021-05-05 NOTE — ROUTINE PROCESS
Bedside and Verbal shift change report given to self (oncoming nurse) by Oliviaverleonel Glaser RN (offgoing nurse). Report included the following information SBAR, Kardex, ED Summary, Procedure Summary, MAR and Recent Results. L subclavian site assessed. Dressing CDI, no hematoma noted. Bed alarm intact

## 2021-05-05 NOTE — PROGRESS NOTES
TRANSFER - IN REPORT:    Verbal report received from [facility] ER (nurse Geralynn Cushing) on [place] being received from [facility] ER for routine progression of care      Report consisted of patients Situation, Background, Assessment and Recommendations(SBAR). Information from the following report(s) SBAR, ED Summary, Intake/Output, MAR, Recent Results and Cardiac Rhythm SR was reviewed with the receiving nurse. Opportunity for questions and clarification was provided. Assessment completed upon patients arrival to unit and care assumed. Patient arrived via EMS. Patient has significant dementia and memory loss. Is only oriented to self. Unable to complete admission database due to AMS. Completed PTA med list from a bag of pill bottles patient arrived with. Dual skin assessment complete with 2nd RN. Bilateral heels dry, flaky, and soft with no evidence of breakdown. Sacrum clear with no evidence of breakdown. Bed alarm activated for safety.

## 2021-05-05 NOTE — ANESTHESIA PREPROCEDURE EVALUATION
Relevant Problems   No relevant active problems       Anesthetic History   No history of anesthetic complications            Review of Systems / Medical History  Pertinent labs reviewed    Pulmonary  Within defined limits                 Neuro/Psych         Psychiatric history and dementia (mild)     Cardiovascular    Hypertension        Dysrhythmias (SSS)   Pacemaker (Pacer for SSS)    Exercise tolerance: <4 METS     GI/Hepatic/Renal     GERD: well controlled    Renal disease: CRI       Endo/Other    Diabetes: type 2         Other Findings            Physical Exam    Airway  Mallampati: II  TM Distance: 4 - 6 cm  Neck ROM: normal range of motion   Mouth opening: Normal     Cardiovascular  Regular rate and rhythm,  S1 and S2 normal,  no murmur, click, rub, or gallop             Dental    Dentition: Edentulous     Pulmonary  Breath sounds clear to auscultation               Abdominal  GI exam deferred       Other Findings            Anesthetic Plan    ASA: 3  Anesthesia type: total IV anesthesia          Induction: Intravenous  Anesthetic plan and risks discussed with: Patient

## 2021-05-05 NOTE — ANESTHESIA POSTPROCEDURE EVALUATION
Procedure(s):  GENERATOR EXCHANGE.    total IV anesthesia    Anesthesia Post Evaluation        Patient location during evaluation: PACU  Patient participation: complete - patient participated  Level of consciousness: awake  Pain management: satisfactory to patient  Airway patency: patent  Anesthetic complications: no  Cardiovascular status: hemodynamically stable  Respiratory status: spontaneous ventilation  Hydration status: euvolemic  Post anesthesia nausea and vomiting:  none      INITIAL Post-op Vital signs:   Vitals Value Taken Time   /72 05/05/21 1446   Temp 36.3 °C (97.3 °F) 05/05/21 1415   Pulse 84 05/05/21 1445   Resp 14 05/05/21 1415   SpO2 95 % 05/05/21 1445   Vitals shown include unvalidated device data.

## 2021-05-05 NOTE — PROGRESS NOTES
Telephone consent obtained from son over phone and verified with Blaine York RN, see consent. 1045 Left AC iv will not flush, removed and new IV inserted.

## 2021-05-05 NOTE — PROGRESS NOTES
Patient arrived  to room with no visual problems for planned gen change with Dr. Ai Garcia. Time allow for patient to verbalize concerns, and concerns addressed with voiced understanding. Patient prepped for procedure as ordered.

## 2021-05-05 NOTE — PROGRESS NOTES
Care Management Interventions  PCP Verified by CM: Yes(Dr Daley)  Last Visit to PCP: 04/21/21  Mode of Transport at Discharge: Other (see comment)(Atilio Field Son [phone] )  Transition of Care Consult (CM Consult): Discharge Planning  Discharge Durable Medical Equipment: No  Physical Therapy Consult: No  Occupational Therapy Consult: No  Current Support Network: Lives with Spouse, Family Lives Nearby  Confirm Follow Up Transport: Family  Discharge Location  Discharge Placement: Home    CM contacted pt's son, Marisa Lopez ([phone]) for [facility] as pt is out of the room getting generator change and pt's dementia. Pt was seen recently at his follow up appointment with Dr Isa Zuñiga and given a f/u for 6 months. Marisa Lopez states pt lives with his spouse in a house. Marisa Lopez and his wife live 5 minutes from his parents. They check on them frequently providing, groceries, meals, setting up medications, and assisting pt to ambulate outside at times. Pt uses a RW to ambulate but is mostly sedentary. Marisa Lopez states [employer] also deliver meals to pt. Marisa Lopez states he and his wife transport pt to his appointments. CM will f/u with pt after procedure. CM will continue to follow for discharge needs.

## 2021-05-06 NOTE — PROGRESS NOTES
[facility] CARDIOLOGY PROGRESS NOTE           5/6/2021 6:55 AM    Admit Date: 5/4/2021    Admit Diagnosis: Syncope [R55]      Subjective:   No complaints this AM, no chest pain or shortness of breath, appropriate post op device pocket pain    Interval History: (History of pertinent interval events obtained from nursing staff)  Cardiac device placed yesterday, no immediate complications, delirium overnight requiring restraints    ROS:  GEN:  No fever or chills  Cardiovascular:  As noted above  Pulmonary:  As noted above  Neuro:  No new focal motor or sensory loss      Objective:     Vitals:    05/05/21 1445 05/05/21 1719 05/05/21 2000 05/06/21 0500   BP: (!) 142/72 (!) 148/87 (!) 152/77 (!) 171/93   Pulse: 84 98 85 100   Resp:  19 18 18   Temp:  98 °F (36.7 °C) 98 °F (36.7 °C) 98.2 °F (36.8 °C)   SpO2: 95% 95% 100% 96%   Weight:    199 lb 1.2 oz (90.3 kg)   Height:           Physical Exam:  General-Well Developed, Well Nourished, No Acute Distress, Alert & Oriented x 3, appropriate mood. CV- regular rate and rhythm no MRG, left infraclavicular pocket with dressing in place, no evidence of hematoma, redness, warmth or excessive drainage  Lung- clear bilaterally  Abd- soft, nontender, nondistended  Ext- no edema bilaterally.     Current Facility-Administered Medications   Medication Dose Route Frequency    lidocaine (XYLOCAINE) 10 mg/mL (1 %) injection 0.1 mL  0.1 mL SubCUTAneous PRN    naloxone (NARCAN) injection 0.04 mg  0.04 mg IntraVENous EVERY 2 MINUTES AS NEEDED    cephALEXin (KEFLEX) capsule 500 mg  500 mg Oral Q8H    sodium chloride (NS) flush 5-40 mL  5-40 mL IntraVENous Q8H    sodium chloride (NS) flush 5-40 mL  5-40 mL IntraVENous PRN    acetaminophen (TYLENOL) tablet 650 mg  650 mg Oral Q4H PRN    HYDROcodone-acetaminophen (NORCO) 5-325 mg per tablet 1 Tab  1 Tab Oral Q4H PRN    ondansetron (ZOFRAN) injection 4 mg  4 mg IntraVENous Q4H PRN    docusate sodium (COLACE) capsule 100 mg  100 mg Oral BID PRN    clonazePAM (KlonoPIN) tablet 0.25 mg  0.25 mg Oral TID    escitalopram oxalate (LEXAPRO) tablet 10 mg  10 mg Oral DAILY    tamsulosin (FLOMAX) capsule 0.4 mg  0.4 mg Oral DAILY    sodium chloride (NS) flush 5-40 mL  5-40 mL IntraVENous Q8H    sodium chloride (NS) flush 5-40 mL  5-40 mL IntraVENous PRN    nitroglycerin (NITROSTAT) tablet 0.4 mg  0.4 mg SubLINGual Q5MIN PRN    insulin lispro (HUMALOG) injection   SubCUTAneous AC&HS    hydrALAZINE (APRESOLINE) 20 mg/mL injection 10 mg  10 mg IntraVENous Q6H PRN    QUEtiapine (SEROquel) tablet 100 mg  100 mg Oral QHS PRN     Data Review:   Recent Results (from the past 24 hour(s))   TSH 3RD GENERATION    Collection Time: 05/05/21 10:14 AM   Result Value Ref Range    TSH 3.060 0.358 - 3.740 uIU/mL   IRON    Collection Time: 05/05/21 10:14 AM   Result Value Ref Range    Iron 37 35 - 150 ug/dL   FERRITIN    Collection Time: 05/05/21 10:14 AM   Result Value Ref Range    Ferritin 144 8 - 388 NG/ML   EKG, 12 LEAD, INITIAL    Collection Time: 05/05/21  3:15 PM   Result Value Ref Range    Ventricular Rate 82 BPM    Atrial Rate 83 BPM    QRS Duration 106 ms    Q-T Interval 392 ms    QTC Calculation (Bezet) 457 ms    Calculated R Axis -32 degrees    Calculated T Axis 86 degrees    Diagnosis       Sinus rhythm with 1st degree A-V block  Left anterior fascicular block  Nonspecific T wave abnormality  Abnormal ECG  When compared with ECG of 04-NOV-2017 20:22,  QRS axis Shifted left  Nonspecific T wave abnormality, worse in Lateral leads  Confirmed by Shanda Norton ([id number]) on 5/5/2021 5:26:35 PM     GLUCOSE, POC    Collection Time: 05/05/21  4:27 PM   Result Value Ref Range    Glucose (POC) 106 (H) 65 - 100 mg/dL    Performed by [address] [place] [address], POC    Collection Time: 05/05/21  9:28 PM   Result Value Ref Range    Glucose (POC) 118 (H) 65 - 100 mg/dL    Performed by Bhavin        EKG:  (EKG has been independently visualized by me with interpretation below)  Assessment:     Principal Problem:    Syncope (5/4/2021)    Active Problems:    HTN (hypertension) (10/15/2009)      Overview: Controlled, benign      CKD (chronic kidney disease) stage 3, GFR 30-59 ml/min ([address] (1/30/2011)      Pacemaker ([employer] Jonathan PM placed 1/31/2011) (2/1/2011)      Diabetes mellitus type 2, controlled ([address] (12/8/2015)      Hyperlipemia, mixed (12/8/2015)      Type 2 diabetes with nephropathy ([address] (11/28/2018)      Plan:   1. Cardiac device implantation: Pt device interrogation this AM reveals normal function, excellent pacing and sensing parameters, CXR without pneumothorax with excellent device position, no recognized complications. 2. Anemia: unclear etiology or chronicity, will repeat CBC this AM, at minimum will require OP work up for anemia  3. Renal insufficiency: unclear chronicity, will repeat BMP this AM  4. Delirium: improved this AM, hopefully home today pending labs and mental status    Tova Prasad MD  Cardiology/Electrophysiology

## 2021-05-06 NOTE — PROGRESS NOTES
Called by nursing because patient is agitated and confused. Patient has history of severe dementia. Upon arrival, patient was back in bed with multiple staff members at the bedside. Reported that patient struck security and locked himself in the bathroom. Primary nurse attempted to call family however they reported that they are in [facility] and asked if patient could be \"sedated or something\". Patient given 2.5mg IV haldol earlier tonight.      Plan:   -- give prn Seroquel (patient PTA medication for agitation)  -- bilateral wrist restraints     Blake Hendrickson NP  4:24 AM

## 2021-05-06 NOTE — ROUTINE PROCESS
Bedside and Verbal shift change report given to self (oncoming nurse) by Cristy Gamble (offgoing nurse). Report included the following information SBAR, Kardex, Intake/Output and MAR.

## 2021-05-06 NOTE — PROGRESS NOTES
2345: Patient admitted for gen change for PPM. patient has hx of Alzheimer's and dementia. pt getting very agitated and confused, trying to pull out IV and monitor. NP, [address] notified. Orders placed in chart for haldol. 0002: Med given, will continue to monitor     0056: patient resting quietly.

## 2021-05-06 NOTE — DISCHARGE INSTRUCTIONS
Patient Education            CARDIAC DEVICE INSTRUCTION SHEET    1. You should have received a 1-2 weeks follow up appointment upon discharge from the hospital.  If you did not receive this appointment prior to leaving the hospital, please contact us at [phone].    2.  DO NOT put ointments, creams or lotions on the incision. 3.  Your dressing will be removed at your follow up appointment. If you have sutures/staples, the nurse will remove them at the follow up appointment. The dressing promotes healing and is meant to stay on the wound. Keep incision site completely dry for one week. During this week you may take a sponge bath, but no shower. After one week you may shower, cleaning the wound with soap and angelo. 4.  Call us IMMEDIATELY if you develop any signs of infection - including redness, drainage at the incision site, swelling or pain at the incision site, or a temperature of 101° F or greater. If you have twitching chest muscles, hiccups that won't stop, or a swollen arm on the side of the incision. Any feelings of light-headedness, chest pain, or shortness of breath. 5.  You may use your pacemaker/ICD arm, but keep your arm lower than your shoulder for the first 8 weeks (or until cleared by a physician) to prevent the device lead(s) from moving. Do not raise your elbow above the level of your shoulder on the ICD implant side. Avoid excessive pushing, pulling, or twisting. 6.         You should not drive until 1 week after your implant or after your first follow-up appointment, whichever comes first. At that time, you can discuss when you may return to your regular driving routine. 7.  Do not lift more than 10 pounds for 4 weeks and 20 pounds for 8 weeks. Do not lift anything heavier than a gallon of milk. 8.  Within 6 weeks you should receive your cardiac device ID card. Carry your card with you at all times. Always show it to any doctor or dentist you see.      9.    You will need to have your device evaluated every 3 months via office, remote, or telephone. 10.  Microwaves WILL NOT harm your device. Warnings do not apply to you. 11.  At airports, always show your cardiac device ID card. You may walk through metal detectors but do not allow the hand held wand near your device. 12  DO NOT have an MRI without contacting your cardiologists office first.     13.  Please refer to the education booklet given to you at the hospital for the activities and equipment you should avoid. Some may reprogram or interfere with your cardiac device. If you receive one shock from your device:   and you feel ok, you may call to let your physician know. but feel poorly, please notify your doctor. You may need to be seen. If you receive more than one shock in a 24 hour period, call your physician to schedule a visit or report to the emergency room. Please call the device clinic or Nabil Bush (EP Nurse) [phone] if you have questions or concerns about your device. Pacemaker Placement: What to Expect at [address] placement is surgery to put a pacemaker in your chest. This surgery may be done if you have bradycardia (a slow heart rate). Your doctor made a cut (incision) in your chest. The doctor put the pacemaker leads through the cut, into a large blood vessel, then into the heart. The doctor put the pacemaker under the skin of your chest and attached the leads to it. Your chest may be sore where the doctor made the cut. You also may have a bruise and mild swelling. These symptoms usually get better in 1 to 2 weeks. You may feel a hard ridge along the incision. This usually gets softer in the months after surgery. You may be able to see or feel the outline of the pacemaker under your skin. You will probably be able to go back to work or your usual routine 1 to 2 weeks after surgery. Pacemaker batteries usually last 5 to 15 years.  Your doctor will talk to you about how often you will need to have your pacemaker checked. You'll need to take steps to safely use electric devices. Some of these devices can stop your pacemaker from working right for a short time. Check with your doctor about what to avoid and what to keep a short distance away from your pacemaker. For example, you will need to stay away from things with strong magnetic and electrical fields. An example is an MRI machine (unless your pacemaker is safe for an MRI). You can use a cellphone and other wireless devices, but keep them at least 6 inches away from your pacemaker. Many household and office electronics don't affect a pacemaker. These include kitchen appliances and computers. This care sheet gives you a general idea about how long it will take for you to recover. But each person recovers at a different pace. Follow the steps below to get better as quickly as possible. How can you care for yourself at home? Activity    · Rest when you feel tired.     · Be active. Walking is a good choice.     · For 4 to 6 weeks:  ? Avoid activities that strain your chest or upper arm muscles. This includes pushing a  or vacuum, or mopping floors. It also includes swimming, or swinging a golf club or tennis racquet. ? Do not raise your arm (the one on the side of your body where the pacemaker is located) above your shoulder. ? Allow your body to heal. Don't move quickly or lift anything heavy until you are feeling better.     · Many people are able to return to work within 1 to 2 weeks after surgery.     · Ask your doctor when it is okay for you to have sex. Diet    · You can eat your normal diet. If your stomach is upset, try bland, low-fat foods like plain rice, broiled chicken, toast, and yogurt. Medicines    · Your doctor will tell you if and when you can restart your medicines.  He or she will also give you instructions about taking any new medicines.     · If you take aspirin or some other blood thinner, be sure to talk to your doctor. He or she will tell you if and when to start taking this medicine again. Make sure that you understand exactly what your doctor wants you to do.     · Be safe with medicines. Read and follow all instructions on the label. ? If the doctor gave you a prescription medicine for pain, take it as prescribed. ? If you are not taking a prescription pain medicine, ask your doctor if you can take an over-the-counter medicine. ? Do not take aspirin, ibuprofen (Advil, Motrin), naproxen (Aleve), or other nonsteroidal anti-inflammatory drugs (NSAIDs) unless your doctor says it is okay.     · If your doctor prescribed antibiotics, take them as directed. Do not stop taking them just because you feel better. You need to take the full course of antibiotics. Incision care    · If you have strips of tape on the incision, leave the tape on for a week or until it falls off.     · Keep the incision dry while it heals. Your doctor may recommend sponge baths for about 7 days, but do not get the incision wet. Your doctor will let you know when you may take showers. After a shower, pat the incision dry.     · Don't use hydrogen peroxide or alcohol on the incision, which can slow healing. You may cover the area with a gauze bandage if it oozes fluid or rubs against clothing. Change the bandage every day.     · Do not take a bath or get into a hot tub for the first 2 weeks, or until your doctor tells you it is okay. Other instructions    · Keep a medical ID card with you at all times that says you have a pacemaker. The card should include the  and model information.     · Wear medical alert jewelry stating that you have a pacemaker. You can buy this at most [employer].     · Check your pulse as directed by your doctor.     · Have your pacemaker checked as often as your doctor recommends. In some cases, this may be done over the phone or the Internet.  Your doctor will give you instructions about how to do this. Follow-up care is a key part of your treatment and safety. Be sure to make and go to all appointments, and call your doctor if you are having problems. It's also a good idea to know your test results and keep a list of the medicines you take. When should you call for help? Call 911 anytime you think you may need emergency care. For example, call if:    · You passed out (lost consciousness).     · You have trouble breathing. Call your doctor now or seek immediate medical care if:    · You are dizzy or light-headed, or you feel like you may faint.     · You have pain that does not get better after you take pain medicine.     · You hear an alarm or feel a vibration from your pacemaker.     · You have loose stitches, or your incision comes open.     · Bright red blood has soaked through the bandage over your incision.     · You have signs of infection, such as:  ? Increased pain, swelling, warmth, or redness. ? Red streaks leading from the incision. ? Pus draining from the incision. ? A fever. Watch closely for changes in your health, and be sure to contact your doctor if:    · You have any problems with your pacemaker. Where can you learn more? Go to http://katie-natalya.info/  Enter G550 in the search box to learn more about \"Pacemaker Placement: What to Expect at Home. \"  Current as of: August 31, 2020               Content Version: 12.8  © 2191-5589 [employer]. Care instructions adapted under license by [employer] and you (which disclaims liability or warranty for this information). If you have questions about a medical condition or this instruction, always ask your healthcare professional. [place] [address] any warranty or liability for your use of this information.        Patient Education   Patient Education      Hydrocodone/Acetaminophen (Vicodin, Norco, Lortab) - (By mouth)   Why this medicine is used:   Treats pain. Contact a nurse or doctor right away if you have:  · Blistering, peeling, red skin rash  · Fast or slow heartbeat, shallow breathing, blue lips, fingernails, or skin  · Anxiety, restlessness, muscle spasms, twitching, seeing or hearing things that are not there  · Dark urine or pale stools, yellow skin or eyes  · Extreme weakness, sweating, seizures, cold or clammy skin  · Lightheadedness, dizziness, fainting, fever, sweating     Common side effects:  · Constipation, nausea, vomiting, loss of appetite, stomach pain  · Tiredness or sleepiness  © 2017 [address] [facility] Information is for End User's use only and may not be sold, redistributed or otherwise used for commercial purposes. Cephalexin (Bio-Cef, Keflex) - (By mouth)   Why this medicine is used:   Treats infections. Contact a nurse or doctor right away if you have:  · Blistering, peeling, or red skin rash  · Severe or bloody diarrhea     Common side effects:  · Mild diarrhea or nausea  © 2017 [address] is for End User's use only and may not be sold, redistributed or otherwise used for commercial purposes.

## 2021-05-06 NOTE — PROGRESS NOTES
0410: Patient became agitated then began swinging fist and locked himself in bathroom. 0531: code grey called overhead. NP, [address] notified. Patient son was called, Shantell Jha, he did not want to come to hospital at this time. Would like us to \"medicate patient\" son notified of restraint placement. 4343: Security placed patient back in bed. Bilateral wrist restraints applied. seroquel given.

## 2021-05-06 NOTE — ROUTINE PROCESS
Discharge instructions reviewed with Cyndi Finnegan. Prescriptions given for Keflex and norco and med info sheets provided for all new medications. Opportunity for questions provided. Patient voiced understanding of all discharge instructions. IV and heart monitor removed Esign not available

## 2021-05-06 NOTE — DISCHARGE SUMMARY
[facility] Cardiology Discharge Summary     Patient ID:  Christina Saldivar  [id number]  57 y.o.  5/16/1930    Admit date: 5/4/2021    Discharge date:  5/5/2021    Admitting Physician: Royce Lubin MD     Discharge Physician: Dr. Amanda Hernandez    Admission Diagnoses: Syncope [R55]    Discharge Diagnoses:    Diagnosis    Syncope    Type [facility] diabetes [facility]ropathy [facility]    Late onset Alzheimer's disease with behavioral disturbance ([facility]    Diabetes mellitus type 2, controlled ([facility]    Hyperlipemia, mixed    Pacemaker ([employer] Jonathan PM placed 1/31/2011)    Bradycardia    CKD (chronic kidney disease) stage 3, GFR 30-59 ml/min ([facility]    HTN (hypertension)       Cardiology Procedures this admission:  pacemaker generator change   Consults: None    Hospital Course: Patient presented to the ER at [facility] with complaints of syncope. Patient had his last device interrogation in 2/2020 that showed KAMAR. He was scheduled for generator change but this was cancelled due to COVID-19 outbreak. Per ER documentation, patient had a syncopal episode after eating dinner. Associated symptoms included vomiting. EMS was called to patient' home. Per report from ED staff, an attempt was made to interrogate patient's device but was not able to be done because battery was dead. Patient was transferred to [facility] facility and admitted to telemetry. Patient does not remember syncopal episode earlier today. Denies any complaints at this time. No family present at this time. Patient was taken to the EP lab and underwent successful implantation of [employer] Jonathan generator by Dr. Kerry Bennett. Patient tolerated the procedure well and was taken to the telemetry floor for recovery. Follow up chest xray showed no pneumothorax. The following morning patient was up feeling well without any complaints of chest pain, shortness of breath, or palpitations. Patient's left subclavian cath site was clean, dry and intact without hematoma. Patient's labs were WNL. Patient was seen and examined by Dr. Elliot James and determined stable and ready for discharge. Patient was instructed on the importance of medication compliance and outpatient follow up. Lab work stable for chronic medical conditions and improved from baseline. Patient has been scheduled for follow-up with [address] [place] [address] [address] [facility] -- device clinic in 10-14 days. Patient was also diagnosed with UTI with pending urine culture while in the ER. He was treated with 1g IV Rocephin and 7-day regimen of Keflex with recommended. DISPOSITION: Patient has been instructed to keep affected arm below shoulder level for the next 4 weeks or until cleared by doctor. The arm sling should be worn while sleeping. The dressing will be removed at follow-up. The incision site must be kept clean and dry. The patient may shower in a few days. Lotions, powders, or creams should be avoided as these can increase the risk of infection. The affected arm should not be used for any pushing, pulling, or lifting until cleared by doctor. Driving is prohibited until cleared by doctor in a follow up appointment. Any signs of infection including increased redness, suspicious drainage, or unexplained fever should be reported to the doctor immediately by calling [phone]. Discharge Exam:  Patient has been seen by Dr. Elliot James: see his progress note for exam details.   Visit Vitals  BP (!) 148/87 (BP 1 Location: Right arm, BP Patient Position: At rest)   Pulse 98   Temp 98 °F (36.7 °C)   Resp 19   Ht 5' 9\" (1.753 m)   Wt 90.2 kg (198 lb 13.7 oz)   SpO2 95%   BMI 29.37 kg/m²         Recent Results (from the past 24 hour(s))   METABOLIC PANEL, BASIC    Collection Time: 05/05/21  3:48 AM   Result Value Ref Range    Sodium 139 138 - 145 mmol/L    Potassium 3.9 3.5 - 5.1 mmol/L    Chloride 109 (H) 98 - 107 mmol/L    CO2 26 21 - 32 mmol/L    Anion gap 4 (L) 7 - 16 mmol/L    Glucose 97 65 - 100 mg/dL    BUN 31 (H) 8 - 23 MG/DL    Creatinine 1.60 (H) 0.8 - 1.5 MG/DL    GFR est AA 52 (L) >60 ml/min/1.73m2    GFR est non-AA 43 (L) >60 ml/min/1.73m2    Calcium 8.3 8.3 - 10.4 MG/DL   CBC WITH AUTOMATED DIFF    Collection Time: 05/05/21  3:48 AM   Result Value Ref Range    WBC 5.2 4.3 - 11.1 K/uL    RBC 2.32 (L) 4.23 - 5.6 M/uL    HGB 8.1 (L) 13.6 - 17.2 g/dL    HCT 23.6 (L) 41.1 - 50.3 %    .7 (H) 79.6 - 97.8 FL    MCH 34.9 (H) 26.1 - 32.9 PG    MCHC 34.3 31.4 - 35.0 g/dL    RDW 14.0 11.9 - 14.6 %    PLATELET 391 (L) 081 - 450 K/uL    MPV 11.2 9.4 - 12.3 FL    ABSOLUTE NRBC 0.00 0.0 - 0.2 K/uL    DF AUTOMATED      NEUTROPHILS 66 43 - 78 %    LYMPHOCYTES 23 13 - 44 %    MONOCYTES 10 4.0 - 12.0 %    EOSINOPHILS 1 0.5 - 7.8 %    BASOPHILS 0 0.0 - 2.0 %    IMMATURE GRANULOCYTES 0 0.0 - 5.0 %    ABS. NEUTROPHILS 3.4 1.7 - 8.2 K/UL    ABS. LYMPHOCYTES 1.2 0.5 - 4.6 K/UL    ABS. MONOCYTES 0.5 0.1 - 1.3 K/UL    ABS. EOSINOPHILS 0.1 0.0 - 0.8 K/UL    ABS. BASOPHILS 0.0 0.0 - 0.2 K/UL    ABS. IMM.  GRANS. 0.0 0.0 - 0.5 K/UL   GLUCOSE, POC    Collection Time: 05/05/21  6:33 AM   Result Value Ref Range    Glucose (POC) 95 65 - 100 mg/dL    Performed by Law (Oliver)    TSH 3RD GENERATION    Collection Time: 05/05/21 10:14 AM   Result Value Ref Range    TSH 3.060 0.358 - 3.740 uIU/mL   IRON    Collection Time: 05/05/21 10:14 AM   Result Value Ref Range    Iron 37 35 - 150 ug/dL   FERRITIN    Collection Time: 05/05/21 10:14 AM   Result Value Ref Range    Ferritin 144 8 - 388 NG/ML   EKG, 12 LEAD, INITIAL    Collection Time: 05/05/21  3:15 PM   Result Value Ref Range    Ventricular Rate 82 BPM    Atrial Rate 83 BPM    QRS Duration 106 ms    Q-T Interval 392 ms    QTC Calculation (Bezet) 457 ms    Calculated R Axis -32 degrees    Calculated T Axis 86 degrees    Diagnosis       Sinus rhythm with 1st degree A-V block  Left anterior fascicular block  Nonspecific T wave abnormality  Abnormal ECG  When compared with ECG of 04-NOV-2017 20:22,  QRS axis Shifted left  Nonspecific T wave abnormality, worse in Lateral leads  Confirmed by Emily Bond ([id number]) on 5/5/2021 5:26:35 PM     GLUCOSE, POC    Collection Time: 05/05/21  4:27 PM   Result Value Ref Range    Glucose (POC) 106 (H) 65 - 100 mg/dL    Performed by [address] [place] [address], POC    Collection Time: 05/05/21  9:28 PM   Result Value Ref Range    Glucose (POC) 118 (H) 65 - 100 mg/dL    Performed by Bhavin          Patient Instructions:          Current Discharge Medication List      START taking these medications    Details   cephALEXin (KEFLEX) 500 mg capsule Take 1 Cap by mouth every eight (8) hours. Indications: UTI culture pending  Qty: 21 Cap, Refills: 0      HYDROcodone-acetaminophen (NORCO) 5-325 mg per tablet Take 1 Tab by mouth every six (6) hours as needed for Pain for up to 3 days. Max Daily Amount: 4 Tabs. Qty: 12 Tab, Refills: 0    Associated Diagnoses: Pacemaker         CONTINUE these medications which have NOT CHANGED    Details   amLODIPine (NORVASC) 5 mg tablet Take 5 mg by mouth daily. Indications: high blood pressure      benazepriL (LOTENSIN) 40 mg tablet Take 40 mg by mouth daily. Indications: high blood pressure      metFORMIN (GLUMETZA ER) 500 mg TG24 24 hour tablet Take 500 mg by mouth daily.  Indications: type 2 diabetes mellitus      QUEtiapine (SEROQUEL) 50 mg tablet 1-2 qhs for agitation  (updated quantity)  Qty: 180 Tab, Refills: 12    Associated Diagnoses: Anxiety      doxepin (SINEQUAN) 25 mg capsule 1 qhs for sleep   (updated quantity)  Qty: 90 Cap, Refills: 12    Associated Diagnoses: Primary insomnia      clonazePAM (KLONOPIN) 0.5 mg tablet 1/2-1 qhs to tid for anxiety  Qty: 90 Tab, Refills: 5    Associated Diagnoses: Anxiety      escitalopram oxalate (LEXAPRO) 10 mg tablet 1 every day for anxiety control  Qty: 90 Tab, Refills: 12    Associated Diagnoses: Anxiety      tamsulosin (FLOMAX) 0.4 mg capsule 1 every day for prostate  Qty: 90 Cap, Refills: 12    Associated Diagnoses: Benign prostatic hyperplasia, unspecified whether lower urinary tract symptoms present      memantine (NAMENDA) 5 mg tablet 1 bid for memory  Qty: 180 Tab, Refills: 12    Associated Diagnoses: Alzheimer's dementia without behavioral disturbance, unspecified timing of dementia onset (HCC)      lidocaine (LIDODERM) 5 % Apply patch to the affected area for 12 hours a day and remove for 12 hours a day.   Qty: 30 Each, Refills: 12    Associated Diagnoses: Chronic bilateral low back pain without sciatica      polyethylene glycol (MIRALAX) 17 gram packet 1 packet in juice daily to prevent constipation  Qty: 130 Packet, Refills: 12    Associated Diagnoses: Drug induced constipation           Matthew Carter NP  05/06/21  9:41 AM

## 2021-05-06 NOTE — PROGRESS NOTES
Care Management Interventions  PCP Verified by CM: Yes(Dr Daley)  Last Visit to PCP: 04/21/21  Mode of Transport at Discharge: Other (see comment)(Atilio Field [phone] )  Transition of Care Consult (CM Consult): Discharge Planning  Discharge Durable Medical Equipment: No  Physical Therapy Consult: No  Occupational Therapy Consult: No  Current Support Network: Lives with Spouse, Family Lives Nearby  Confirm Follow Up Transport: Family  Discharge Location  Discharge Placement: Home    Pt with discharge order. Pt is s/p [employer] generator for KAMAR. No CM needs identified at discharge.

## 2022-01-01 ENCOUNTER — HOSPICE ADMISSION (OUTPATIENT)
Dept: HOSPICE | Facility: HOSPICE | Age: 87
End: 2022-01-01